# Patient Record
Sex: FEMALE | Race: WHITE | HISPANIC OR LATINO | Employment: FULL TIME | URBAN - METROPOLITAN AREA
[De-identification: names, ages, dates, MRNs, and addresses within clinical notes are randomized per-mention and may not be internally consistent; named-entity substitution may affect disease eponyms.]

---

## 2018-02-19 ENCOUNTER — HOSPITAL ENCOUNTER (EMERGENCY)
Facility: MEDICAL CENTER | Age: 48
End: 2018-02-19
Attending: EMERGENCY MEDICINE
Payer: COMMERCIAL

## 2018-02-19 VITALS
RESPIRATION RATE: 18 BRPM | BODY MASS INDEX: 34.48 KG/M2 | TEMPERATURE: 97.8 F | HEART RATE: 96 BPM | SYSTOLIC BLOOD PRESSURE: 130 MMHG | HEIGHT: 62 IN | OXYGEN SATURATION: 99 % | WEIGHT: 187.39 LBS | DIASTOLIC BLOOD PRESSURE: 90 MMHG

## 2018-02-19 DIAGNOSIS — B01.89 VARICELLA WITH COMPLICATION: ICD-10-CM

## 2018-02-19 PROCEDURE — 99283 EMERGENCY DEPT VISIT LOW MDM: CPT

## 2018-02-19 RX ORDER — VALACYCLOVIR HYDROCHLORIDE 1 G/1
1000 TABLET, FILM COATED ORAL 2 TIMES DAILY
Qty: 14 TAB | Refills: 0 | Status: SHIPPED | OUTPATIENT
Start: 2018-02-19 | End: 2018-02-26

## 2018-02-19 ASSESSMENT — PAIN SCALES - GENERAL: PAINLEVEL_OUTOF10: 3

## 2018-02-19 NOTE — ED NOTES
"Pt presents with complaints of intermittent fevers and diffuse body rash since this past Friday.  \"I believe I have chicken pocks.\"  "

## 2018-02-19 NOTE — ED NOTES
Mask applied and patient in isolation  Dr in to see patient and discharged to home with instructions and prescription.  Patient educated about infection being airborne and contact isolation with good understanding.  Home with familiy

## 2018-02-19 NOTE — ED PROVIDER NOTES
ED Provider Note  CHIEF COMPLAINT  Chief Complaint   Patient presents with   • Rash   • Fever       HPI  Mirta Chang is a 47 y.o. female who presents with rash. She was exposed to herpes zoster concerns she may have chickenpox. She has had fever some itching. She has no shortness of breath headache visual changes all other systems are negative    REVIEW OF SYSTEMS  See HPI for further details    PAST MEDICAL HISTORY  Past Medical History:   Diagnosis Date   • Arthritis     osteo back feet legs arms hands   • Asthma     daily inhaler   • Autism spectrum disorder 3/4/2015    Hochberger's disease  anxiety and depression   • Breath shortness    • Cold     may 1, 16   • Pain    • Snoring        FAMILY HISTORY  Family History   Problem Relation Age of Onset   • Diabetes Mother    • Diabetes Father    • Heart Disease Maternal Grandmother        SOCIAL HISTORY  Social History     Social History   • Marital status:      Spouse name: N/A   • Number of children: N/A   • Years of education: N/A     Social History Main Topics   • Smoking status: Never Smoker   • Smokeless tobacco: Never Used   • Alcohol use Yes      Comment: Occasionally   • Drug use: No   • Sexual activity: Not on file     Other Topics Concern   • Not on file     Social History Narrative   • No narrative on file       SURGICAL HISTORY  Past Surgical History:   Procedure Laterality Date   • CERVICAL DISK AND FUSION ANTERIOR Right 5/20/2016    Procedure: CERVICAL DISK AND FUSION ANTERIOR C5/6;  Surgeon: Jude Beauchamp III, M.D.;  Location: SURGERY [facility];  Service:    • GYN SURGERY      BTL reversal    • GYN SURGERY      c sec x 5   • TUBAL LIGATION         CURRENT MEDICATIONS  Home Medications    **Home medications have not yet been reviewed for this encounter**         ALLERGIES  Allergies   Allergen Reactions   • Influenza Virus Vaccine    • Lactose Nausea     No milk to drink.     • Eggs Itching     Also gets GI distress  "      PHYSICAL EXAM  VITAL SIGNS: /90   Pulse 96   Temp 36.6 °C (97.8 °F)   Resp 18   Ht 1.575 m (5' 2\") Comment: Statd  Wt 85 kg (187 lb 6.3 oz)   LMP 02/19/2018   SpO2 99%   BMI 34.27 kg/m²     Constitutional: Patient is alert and oriented x3 in no distress   HENT: Moist mucous membranes pharynx is clear  Eyes: No conjunctivitis  Neck: Trachea is midline no palpable thyroid  Lymphatic: Negative anterior cervical lymphadenopathy  Cardiovascular: Normal heart rate   Thorax & Lungs: Clear to auscultation  Abdomen: Obese nontender  Neurologic: Normal motor sensation station K cranial nerves  Extremities: Full range of motion  Skin: Patient has a sporadic rash of lesions about 2-3 mm on erythematous base anywhere from erythema or blistering to crusting consistent with varicella    Psychiatric: Affect normal, Judgment normal, Mood normal.           COURSE & MEDICAL DECISION MAKING  Pertinent Labs & Imaging studies reviewed. (See chart for details)  Patient does not have any other symptoms or rash and fever. I will place her on valacyclovir have her off work until rash clears given return precautions and follow-up    FINAL IMPRESSION  1.   1. Varicella with complication        2.   3.         Electronically signed by: Johnson Han, 2/19/2018 1:20 PM        "

## 2018-02-19 NOTE — DISCHARGE INSTRUCTIONS
Chickenpox, Adult  Chickenpox is an illness caused by a virus. Chickenpox can be very serious for adults. Adults have a higher risk for complications than children. Complications of chickenpox include:  · Pneumonia.  · Skin infection.  · Bone infection (osteomyelitis).  · Joint infection (septic arthritis).  · Brain infection (encephalitis).  · Toxic shock syndrome.  · Bleeding problems.  · Problems with balance and muscle control (cerebellar ataxia).  · Having a baby with a birth defect, if you are pregnant.  · Death.  If you have had chickenpox once, you probably will not get it again. If you are exposed to chickenpox and have never had the illness or the vaccine, you may develop the illness within 21 days.  CAUSES   Chickenpox is caused by a virus. This virus spreads easily from one person to another through the tiny droplets released into the air when an infected person coughs or sneezes. It also spreads through the fluid produced by the chickenpox rash.  RISK FACTORS  People who have never had chickenpox and have never been vaccinated are at risk. You may also be at greater risk for chickenpox if:  · You are a health care worker.  · You are a college student.  · You are in the .  · You live in an institution.  · You are a teacher.  · You have poor resistance to infection.  SIGNS AND SYMPTOMS   · Rash. The rash is made up of itchy blisters that heal over with a crusty scab. The rash may appear a day or two after other symptoms.  · Body aches and pain.  · Headache.  · Irritability.  · Tiredness.  · Fever.  · Sore throat.  DIAGNOSIS   Your health care provider will make a diagnosis based on your symptoms. You may have a blood test to confirm the diagnosis.  TREATMENT   Treatments may include:  · Taking medicine to shorten how long the illness lasts and to reduce its severity.  · Applying calamine lotion to relieve itchiness.  · Using baking soda or oatmeal baths to soothe itchy skin.  Ask your health care  provider if you may use a type of over-the-counter medicine called an antihistamine to reduce itching.  HOME CARE INSTRUCTIONS   · Take medicines only as directed by your health care provider.  · Try taking a bath in lukewarm water every few hours. Add several tablespoons of baking soda or oatmeal to the water to make the bath more soothing. Do not bathe in hot water.  · Apply an ice pack or a cold washcloth to the rash to relieve itching.  · Wash your hands often. This lowers your chance of getting a bacterial skin infection and passing the virus to others.  · Do not eat or drink spicy, salty, or acidic things if you have blisters in your mouth. Soft, bland, and cold foods and beverages will be easiest to swallow.  · Do not be around:  ¨ People who have not had chickenpox and have not been vaccinated against it.  ¨ People with a weakened immune system, including those with HIV, AIDS, or cancer, those who have had a transplant or are on chemotherapy, and those who take medicines that weaken the immune system.  ¨ Pregnant women.  · If a person is exposed to your chickenpox and has not had it before or been vaccinated against it, has a weakened immune system, or is pregnant, he or she should call a health care provider.  SEEK IMMEDIATE MEDICAL CARE IF:   · You have trouble breathing.  · You have a severe headache.  · You have a stiff neck.  · You have severe joint pain or stiffness.  · You feel disoriented or confused.  · You have trouble walking or keeping your balance.  · You have a fever and your symptoms suddenly get worse.  · The area around one of the blisters leaks pus or becomes very red, hot to the touch, or painful.  MAKE SURE YOU:  · Understand these instructions.  · Will watch your condition.  · Will get help right away if you are not doing well or get worse.     This information is not intended to replace advice given to you by your health care provider. Make sure you discuss any questions you have with  your health care provider.     Document Released: 09/26/2009 Document Revised: 01/08/2016 Document Reviewed: 11/11/2014  [employer] Interactive Patient Education ©2016 [employer]

## 2018-02-20 ENCOUNTER — PATIENT OUTREACH (OUTPATIENT)
Dept: HEALTH INFORMATION MANAGEMENT | Facility: OTHER | Age: 48
End: 2018-02-20

## 2018-05-21 ENCOUNTER — OFFICE VISIT (OUTPATIENT)
Dept: MEDICAL GROUP | Facility: MEDICAL CENTER | Age: 48
End: 2018-05-21
Payer: COMMERCIAL

## 2018-05-21 VITALS
OXYGEN SATURATION: 99 % | SYSTOLIC BLOOD PRESSURE: 112 MMHG | TEMPERATURE: 98.7 F | DIASTOLIC BLOOD PRESSURE: 66 MMHG | HEIGHT: 62 IN | BODY MASS INDEX: 33.49 KG/M2 | RESPIRATION RATE: 16 BRPM | WEIGHT: 182 LBS | HEART RATE: 94 BPM

## 2018-05-21 DIAGNOSIS — G43.109 MIGRAINE WITH AURA AND WITHOUT STATUS MIGRAINOSUS, NOT INTRACTABLE: ICD-10-CM

## 2018-05-21 DIAGNOSIS — Z13.220 SCREENING, LIPID: ICD-10-CM

## 2018-05-21 DIAGNOSIS — G89.29 CHRONIC NECK PAIN: ICD-10-CM

## 2018-05-21 DIAGNOSIS — M54.2 CHRONIC NECK PAIN: ICD-10-CM

## 2018-05-21 DIAGNOSIS — J45.20 MILD INTERMITTENT ASTHMA WITHOUT COMPLICATION: ICD-10-CM

## 2018-05-21 DIAGNOSIS — L65.9 HAIR LOSS: ICD-10-CM

## 2018-05-21 DIAGNOSIS — Z12.31 VISIT FOR SCREENING MAMMOGRAM: ICD-10-CM

## 2018-05-21 PROCEDURE — 99214 OFFICE O/P EST MOD 30 MIN: CPT | Performed by: INTERNAL MEDICINE

## 2018-05-21 RX ORDER — SUMATRIPTAN 100 MG/1
100 TABLET, FILM COATED ORAL
Qty: 10 TAB | Refills: 3 | Status: SHIPPED | OUTPATIENT
Start: 2018-05-21 | End: 2022-01-01

## 2018-05-21 RX ORDER — ALBUTEROL SULFATE 90 UG/1
1 AEROSOL, METERED RESPIRATORY (INHALATION) ONCE
OUTPATIENT
Start: 2018-05-21 | End: 2018-05-22

## 2018-05-21 ASSESSMENT — PATIENT HEALTH QUESTIONNAIRE - PHQ9: CLINICAL INTERPRETATION OF PHQ2 SCORE: 0

## 2018-05-21 NOTE — PROGRESS NOTES
"CC: Asthma, hair loss, neck pain.    HPI:   Mirta presents today with the following.    1. Mild intermittent asthma without complication  Presents with very mild asthma reporting using her inhaler only occasionally the last one was filled is over 2 years old needing new prescriptions.    2. Chronic neck pain  She does have chronic back pain with numbness in her fingertips but no weakness.  She has been followed by spine surgeon but has not seen him in some time.  States that she is concerned about going back his she feels he will likely recommend surgery.    3. Migraine with aura and without status migrainosus, not intractable  Reports headaches are still present about once per month she has taken Imitrex in the past which was helpful    4. Hair loss  Complaining of hair and skin changes weight is persistently elevated.  Having more difficult time with activity.        Patient Active Problem List    Diagnosis Date Noted   • Chronic neck pain 05/21/2018   • Annular tear of cervical disc 05/20/2016   • Autism spectrum disorder 03/04/2015   • Asthma, mild intermittent 12/22/2014       Current Outpatient Prescriptions   Medication Sig Dispense Refill   • sumatriptan (IMITREX) 100 MG tablet Take 1 Tab by mouth Once PRN for Migraine for up to 1 dose. 10 Tab 3     Current Facility-Administered Medications   Medication Dose Route Frequency Provider Last Rate Last Dose   • albuterol inhaler 1 Puff  1 Puff Inhalation Once Carlos Lao M.D.             Allergies as of 05/21/2018 - Reviewed 05/21/2018   Allergen Reaction Noted   • Influenza virus vaccine  07/19/2012   • Lactose Nausea 05/20/2016   • Eggs Itching 07/19/2012        ROS: Denies Chest pain, SOB, LE edema.    /66   Pulse 94   Temp 37.1 °C (98.7 °F)   Resp 16   Ht 1.575 m (5' 2\")   Wt 82.6 kg (182 lb)   SpO2 99%   BMI 33.29 kg/m²     Physical Exam:  Gen:         Alert and oriented, No apparent distress.  Neck:        No Lymphadenopathy or " Bruits.  Lungs:     Clear to auscultation bilaterally  CV:          Regular rate and rhythm. No murmurs, rubs or gallops.               Ext:          No clubbing, cyanosis, edema.      Assessment and Plan.   47 y.o. female with the following issues.    1. Mild intermittent asthma without complication  Stable refilled inhaler  - albuterol inhaler 1 Puff; Inhale 1 Puff by mouth Once.    2. Chronic neck pain  Follow along with surgeon    3. Migraine with aura and without status migrainosus, not intractable  Refilled immature  - sumatriptan (IMITREX) 100 MG tablet; Take 1 Tab by mouth Once PRN for Migraine for up to 1 dose.  Dispense: 10 Tab; Refill: 3    4. Hair loss  Sending for blood work  - TSH; Future    5. Screening, lipid    - COMP METABOLIC PANEL; Future  - LIPID PROFILE; Future    6. Visit for screening mammogram    - MA-SCREEN MAMMO W/CAD-BILAT; Future

## 2018-06-09 ENCOUNTER — HOSPITAL ENCOUNTER (OUTPATIENT)
Dept: LAB | Facility: MEDICAL CENTER | Age: 48
End: 2018-06-09
Attending: INTERNAL MEDICINE
Payer: COMMERCIAL

## 2018-06-09 ENCOUNTER — HOSPITAL ENCOUNTER (OUTPATIENT)
Dept: RADIOLOGY | Facility: MEDICAL CENTER | Age: 48
End: 2018-06-09
Attending: INTERNAL MEDICINE
Payer: COMMERCIAL

## 2018-06-09 DIAGNOSIS — L65.9 HAIR LOSS: ICD-10-CM

## 2018-06-09 DIAGNOSIS — Z13.220 SCREENING, LIPID: ICD-10-CM

## 2018-06-09 DIAGNOSIS — Z12.31 VISIT FOR SCREENING MAMMOGRAM: ICD-10-CM

## 2018-06-09 LAB
ALBUMIN SERPL BCP-MCNC: 4 G/DL (ref 3.2–4.9)
ALBUMIN/GLOB SERPL: 1.2 G/DL
ALP SERPL-CCNC: 64 U/L (ref 30–99)
ALT SERPL-CCNC: 15 U/L (ref 2–50)
ANION GAP SERPL CALC-SCNC: 8 MMOL/L (ref 0–11.9)
AST SERPL-CCNC: 15 U/L (ref 12–45)
BILIRUB SERPL-MCNC: 0.4 MG/DL (ref 0.1–1.5)
BUN SERPL-MCNC: 11 MG/DL (ref 8–22)
CALCIUM SERPL-MCNC: 8.9 MG/DL (ref 8.5–10.5)
CHLORIDE SERPL-SCNC: 107 MMOL/L (ref 96–112)
CHOLEST SERPL-MCNC: 159 MG/DL (ref 100–199)
CO2 SERPL-SCNC: 23 MMOL/L (ref 20–33)
CREAT SERPL-MCNC: 0.6 MG/DL (ref 0.5–1.4)
GLOBULIN SER CALC-MCNC: 3.4 G/DL (ref 1.9–3.5)
GLUCOSE SERPL-MCNC: 100 MG/DL (ref 65–99)
HDLC SERPL-MCNC: 44 MG/DL
LDLC SERPL CALC-MCNC: 98 MG/DL
POTASSIUM SERPL-SCNC: 3.8 MMOL/L (ref 3.6–5.5)
PROT SERPL-MCNC: 7.4 G/DL (ref 6–8.2)
SODIUM SERPL-SCNC: 138 MMOL/L (ref 135–145)
TRIGL SERPL-MCNC: 86 MG/DL (ref 0–149)
TSH SERPL DL<=0.005 MIU/L-ACNC: 1.96 UIU/ML (ref 0.38–5.33)

## 2018-06-09 PROCEDURE — 80061 LIPID PANEL: CPT

## 2018-06-09 PROCEDURE — 36415 COLL VENOUS BLD VENIPUNCTURE: CPT

## 2018-06-09 PROCEDURE — 80053 COMPREHEN METABOLIC PANEL: CPT

## 2018-06-09 PROCEDURE — 77067 SCR MAMMO BI INCL CAD: CPT

## 2018-06-09 PROCEDURE — 84443 ASSAY THYROID STIM HORMONE: CPT

## 2018-07-05 ENCOUNTER — OFFICE VISIT (OUTPATIENT)
Dept: MEDICAL GROUP | Facility: MEDICAL CENTER | Age: 48
End: 2018-07-05
Payer: COMMERCIAL

## 2018-07-05 ENCOUNTER — HOSPITAL ENCOUNTER (OUTPATIENT)
Facility: MEDICAL CENTER | Age: 48
End: 2018-07-05
Attending: INTERNAL MEDICINE
Payer: COMMERCIAL

## 2018-07-05 VITALS
WEIGHT: 181 LBS | HEART RATE: 96 BPM | SYSTOLIC BLOOD PRESSURE: 110 MMHG | RESPIRATION RATE: 16 BRPM | HEIGHT: 62 IN | BODY MASS INDEX: 33.31 KG/M2 | TEMPERATURE: 99.1 F | OXYGEN SATURATION: 99 % | DIASTOLIC BLOOD PRESSURE: 60 MMHG

## 2018-07-05 DIAGNOSIS — R73.02 IGT (IMPAIRED GLUCOSE TOLERANCE): ICD-10-CM

## 2018-07-05 DIAGNOSIS — Z12.4 CERVICAL CANCER SCREENING: ICD-10-CM

## 2018-07-05 PROCEDURE — 88175 CYTOPATH C/V AUTO FLUID REDO: CPT

## 2018-07-05 PROCEDURE — 99396 PREV VISIT EST AGE 40-64: CPT | Performed by: INTERNAL MEDICINE

## 2018-07-05 PROCEDURE — 87624 HPV HI-RISK TYP POOLED RSLT: CPT

## 2018-07-06 DIAGNOSIS — Z12.4 CERVICAL CANCER SCREENING: ICD-10-CM

## 2018-07-10 LAB
CYTOLOGY REG CYTOL: NORMAL
HPV HR 12 DNA CVX QL NAA+PROBE: NEGATIVE
HPV16 DNA SPEC QL NAA+PROBE: NEGATIVE
HPV18 DNA SPEC QL NAA+PROBE: NEGATIVE
SPECIMEN SOURCE: NORMAL

## 2019-04-06 ENCOUNTER — HOSPITAL ENCOUNTER (OUTPATIENT)
Dept: LAB | Facility: MEDICAL CENTER | Age: 49
End: 2019-04-06
Attending: NURSE PRACTITIONER
Payer: COMMERCIAL

## 2019-04-06 LAB
BASOPHILS # BLD AUTO: 0.9 % (ref 0–1.8)
BASOPHILS # BLD: 0.05 K/UL (ref 0–0.12)
EOSINOPHIL # BLD AUTO: 0.08 K/UL (ref 0–0.51)
EOSINOPHIL NFR BLD: 1.5 % (ref 0–6.9)
ERYTHROCYTE [DISTWIDTH] IN BLOOD BY AUTOMATED COUNT: 41.9 FL (ref 35.9–50)
HCT VFR BLD AUTO: 40.7 % (ref 37–47)
HGB BLD-MCNC: 13.3 G/DL (ref 12–16)
IMM GRANULOCYTES # BLD AUTO: 0.02 K/UL (ref 0–0.11)
IMM GRANULOCYTES NFR BLD AUTO: 0.4 % (ref 0–0.9)
LYMPHOCYTES # BLD AUTO: 1.54 K/UL (ref 1–4.8)
LYMPHOCYTES NFR BLD: 28.2 % (ref 22–41)
MCH RBC QN AUTO: 28.2 PG (ref 27–33)
MCHC RBC AUTO-ENTMCNC: 32.7 G/DL (ref 33.6–35)
MCV RBC AUTO: 86.2 FL (ref 81.4–97.8)
MONOCYTES # BLD AUTO: 0.42 K/UL (ref 0–0.85)
MONOCYTES NFR BLD AUTO: 7.7 % (ref 0–13.4)
NEUTROPHILS # BLD AUTO: 3.35 K/UL (ref 2–7.15)
NEUTROPHILS NFR BLD: 61.3 % (ref 44–72)
NRBC # BLD AUTO: 0 K/UL
NRBC BLD-RTO: 0 /100 WBC
PLATELET # BLD AUTO: 212 K/UL (ref 164–446)
PMV BLD AUTO: 11.2 FL (ref 9–12.9)
RBC # BLD AUTO: 4.72 M/UL (ref 4.2–5.4)
WBC # BLD AUTO: 5.5 K/UL (ref 4.8–10.8)

## 2019-04-06 PROCEDURE — 85025 COMPLETE CBC W/AUTO DIFF WBC: CPT

## 2019-04-06 PROCEDURE — 85610 PROTHROMBIN TIME: CPT

## 2019-04-06 PROCEDURE — 36415 COLL VENOUS BLD VENIPUNCTURE: CPT

## 2019-04-06 PROCEDURE — 85730 THROMBOPLASTIN TIME PARTIAL: CPT

## 2019-04-08 LAB
APTT PPP: 28.1 SEC (ref 24.7–36)
INR PPP: 0.96 (ref 0.87–1.13)
PROTHROMBIN TIME: 12.9 SEC (ref 12–14.6)

## 2019-04-23 ENCOUNTER — HOSPITAL ENCOUNTER (OUTPATIENT)
Dept: RADIOLOGY | Facility: MEDICAL CENTER | Age: 49
End: 2019-04-23
Attending: PSYCHIATRY & NEUROLOGY
Payer: COMMERCIAL

## 2019-04-23 ENCOUNTER — HOSPITAL ENCOUNTER (OUTPATIENT)
Facility: MEDICAL CENTER | Age: 49
End: 2019-04-23
Attending: PSYCHIATRY & NEUROLOGY
Payer: COMMERCIAL

## 2019-04-23 DIAGNOSIS — R20.0 TACTILE ANESTHESIA: ICD-10-CM

## 2019-04-23 DIAGNOSIS — G24.8 DYSTONIA LENTICULARIS: ICD-10-CM

## 2019-04-23 LAB
BURR CELLS/RBC NFR CSF MANUAL: 0 %
CLARITY CSF: CLEAR
COLOR CSF: COLORLESS
COLOR SPUN CSF: COLORLESS
CYTOLOGY REG CYTOL: NORMAL
GLUCOSE CSF-MCNC: 68 MG/DL (ref 40–80)
GRAM STN SPEC: NORMAL
LYMPHOCYTES NFR CSF: 83 %
MONONUC CELLS NFR CSF: 17 %
PROT CSF-MCNC: 17 MG/DL (ref 15–45)
RBC # CSF: 0 CELLS/UL
SIGNIFICANT IND 70042: NORMAL
SITE SITE: NORMAL
SOURCE SOURCE: NORMAL
SPECIMEN VOL CSF: 16 ML
TUBE # CSF: 3
TUBE # CSF: 3
WBC # CSF: 1 CELLS/UL (ref 0–10)

## 2019-04-23 PROCEDURE — 82945 GLUCOSE OTHER FLUID: CPT

## 2019-04-23 PROCEDURE — 82784 ASSAY IGA/IGD/IGG/IGM EACH: CPT | Mod: 91

## 2019-04-23 PROCEDURE — 82040 ASSAY OF SERUM ALBUMIN: CPT

## 2019-04-23 PROCEDURE — 86255 FLUORESCENT ANTIBODY SCREEN: CPT

## 2019-04-23 PROCEDURE — 83916 OLIGOCLONAL BANDS: CPT

## 2019-04-23 PROCEDURE — 87070 CULTURE OTHR SPECIMN AEROBIC: CPT

## 2019-04-23 PROCEDURE — 84157 ASSAY OF PROTEIN OTHER: CPT

## 2019-04-23 PROCEDURE — 87798 DETECT AGENT NOS DNA AMP: CPT | Mod: 91

## 2019-04-23 PROCEDURE — 86316 IMMUNOASSAY TUMOR OTHER: CPT

## 2019-04-23 PROCEDURE — 86789 WEST NILE VIRUS ANTIBODY: CPT

## 2019-04-23 PROCEDURE — 86788 WEST NILE VIRUS AB IGM: CPT

## 2019-04-23 PROCEDURE — 87476 LYME DIS DNA AMP PROBE: CPT

## 2019-04-23 PROCEDURE — 62270 DX LMBR SPI PNXR: CPT

## 2019-04-23 PROCEDURE — 89051 BODY FLUID CELL COUNT: CPT

## 2019-04-23 PROCEDURE — 82042 OTHER SOURCE ALBUMIN QUAN EA: CPT

## 2019-04-23 PROCEDURE — 82164 ANGIOTENSIN I ENZYME TEST: CPT

## 2019-04-23 PROCEDURE — 86694 HERPES SIMPLEX NES ANTBDY: CPT

## 2019-04-23 PROCEDURE — 87205 SMEAR GRAM STAIN: CPT

## 2019-04-23 PROCEDURE — 87181 SC STD AGAR DILUTION PER AGT: CPT | Mod: 91

## 2019-04-23 PROCEDURE — 86592 SYPHILIS TEST NON-TREP QUAL: CPT

## 2019-04-23 RX ORDER — LIDOCAINE HYDROCHLORIDE 10 MG/ML
INJECTION, SOLUTION INFILTRATION; PERINEURAL
Status: DISPENSED
Start: 2019-04-23 | End: 2019-04-24

## 2019-04-24 NOTE — OR SURGEON
Immediate Post- Operative Note    PostOp Diagnosis: No DDD    Procedure(s): LP w/ fluoro    Estimated Blood Loss: Less than 1 ml    Complications: None    4/23/2019     5:22 PM     Dante Mckenna

## 2019-04-26 ENCOUNTER — ANESTHESIA EVENT (OUTPATIENT)
Dept: EMERGENCY MEDICINE | Facility: MEDICAL CENTER | Age: 49
End: 2019-04-26
Payer: COMMERCIAL

## 2019-04-26 ENCOUNTER — HOSPITAL ENCOUNTER (EMERGENCY)
Facility: MEDICAL CENTER | Age: 49
End: 2019-04-26
Attending: EMERGENCY MEDICINE
Payer: COMMERCIAL

## 2019-04-26 ENCOUNTER — ANESTHESIA (OUTPATIENT)
Dept: EMERGENCY MEDICINE | Facility: MEDICAL CENTER | Age: 49
End: 2019-04-26
Payer: COMMERCIAL

## 2019-04-26 VITALS
WEIGHT: 181.44 LBS | TEMPERATURE: 98.9 F | HEIGHT: 62 IN | HEART RATE: 74 BPM | SYSTOLIC BLOOD PRESSURE: 153 MMHG | BODY MASS INDEX: 33.39 KG/M2 | OXYGEN SATURATION: 98 % | RESPIRATION RATE: 16 BRPM | DIASTOLIC BLOOD PRESSURE: 94 MMHG

## 2019-04-26 DIAGNOSIS — G97.1 SPINAL HEADACHE: ICD-10-CM

## 2019-04-26 LAB
ALB CSF/SERPL: 2.3 RATIO (ref 0–9)
ALBUMIN CSF-MCNC: 9 MG/DL (ref 0–35)
ALBUMIN SERPL BCP-MCNC: 3.7 G/DL (ref 3.2–4.9)
ALBUMIN SERPL-MCNC: 3990 MG/DL (ref 3500–5200)
ALBUMIN/GLOB SERPL: 1.2 G/DL
ALP SERPL-CCNC: 51 U/L (ref 30–99)
ALT SERPL-CCNC: 20 U/L (ref 2–50)
ANION GAP SERPL CALC-SCNC: 6 MMOL/L (ref 0–11.9)
AST SERPL-CCNC: 19 U/L (ref 12–45)
BASOPHILS # BLD AUTO: 0.6 % (ref 0–1.8)
BASOPHILS # BLD: 0.03 K/UL (ref 0–0.12)
BILIRUB SERPL-MCNC: 0.4 MG/DL (ref 0.1–1.5)
BUN SERPL-MCNC: 7 MG/DL (ref 8–22)
CALCIUM SERPL-MCNC: 8.5 MG/DL (ref 8.4–10.2)
CHLORIDE SERPL-SCNC: 107 MMOL/L (ref 96–112)
CO2 SERPL-SCNC: 24 MMOL/L (ref 20–33)
CREAT SERPL-MCNC: 0.58 MG/DL (ref 0.5–1.4)
EOSINOPHIL # BLD AUTO: 0.08 K/UL (ref 0–0.51)
EOSINOPHIL NFR BLD: 1.5 % (ref 0–6.9)
ERYTHROCYTE [DISTWIDTH] IN BLOOD BY AUTOMATED COUNT: 45.1 FL (ref 35.9–50)
GLOBULIN SER CALC-MCNC: 3 G/DL (ref 1.9–3.5)
GLUCOSE SERPL-MCNC: 100 MG/DL (ref 65–99)
HCT VFR BLD AUTO: 40.3 % (ref 37–47)
HGB BLD-MCNC: 13.3 G/DL (ref 12–16)
HSV1+2 IGM CSF-ACNC: 0.11 IV
IGG CSF-MCNC: 1.2 MG/DL (ref 0–6)
IGG SERPL-MCNC: 1210 MG/DL (ref 768–1632)
IGG SYNTH RATE SER+CSF CALC-MRATE: <0 MG/D
IGG/ALB CLEAR SER+CSF-RTO: 0.44 RATIO (ref 0.28–0.66)
IGG/ALB CSF: 0.13 RATIO (ref 0.09–0.25)
IMM GRANULOCYTES # BLD AUTO: 0.02 K/UL (ref 0–0.11)
IMM GRANULOCYTES NFR BLD AUTO: 0.4 % (ref 0–0.9)
JC VIRUS SOURCE  Q4279: NORMAL
JCPYV DNA SERPL QL NAA+PROBE: NOT DETECTED
LYMPHOCYTES # BLD AUTO: 1.25 K/UL (ref 1–4.8)
LYMPHOCYTES NFR BLD: 23.3 % (ref 22–41)
MCH RBC QN AUTO: 28.1 PG (ref 27–33)
MCHC RBC AUTO-ENTMCNC: 33 G/DL (ref 33.6–35)
MCV RBC AUTO: 85.2 FL (ref 81.4–97.8)
MONOCYTES # BLD AUTO: 0.46 K/UL (ref 0–0.85)
MONOCYTES NFR BLD AUTO: 8.6 % (ref 0–13.4)
NEUTROPHILS # BLD AUTO: 3.52 K/UL (ref 2–7.15)
NEUTROPHILS NFR BLD: 65.6 % (ref 44–72)
NRBC # BLD AUTO: 0 K/UL
NRBC BLD-RTO: 0 /100 WBC
NSE SERPL-MCNC: 11.4 UG/L (ref 3.7–8.9)
OLIGOCLONAL BANDS CSF ELPH-IMP: NEGATIVE
OLIGOCLONAL BANDS CSF ELPH-IMP: NORMAL
OLIGOCLONAL BANDS CSF IEF: 0 BANDS (ref 0–1)
PLATELET # BLD AUTO: 194 K/UL (ref 164–446)
PMV BLD AUTO: 10.8 FL (ref 9–12.9)
POTASSIUM SERPL-SCNC: 3.5 MMOL/L (ref 3.6–5.5)
PROCALCITONIN SERPL-MCNC: <0.05 NG/ML
PROT SERPL-MCNC: 6.7 G/DL (ref 6–8.2)
RBC # BLD AUTO: 4.73 M/UL (ref 4.2–5.4)
SODIUM SERPL-SCNC: 137 MMOL/L (ref 135–145)
SPECIMEN SOURCE: NORMAL
VZV DNA SPEC QL NAA+PROBE: NOT DETECTED
WBC # BLD AUTO: 5.4 K/UL (ref 4.8–10.8)
WNV IGG CSF IA-ACNC: 0.04 IV
WNV IGM CSF IA-ACNC: 0.01 IV

## 2019-04-26 PROCEDURE — 36415 COLL VENOUS BLD VENIPUNCTURE: CPT

## 2019-04-26 PROCEDURE — 80053 COMPREHEN METABOLIC PANEL: CPT

## 2019-04-26 PROCEDURE — 700111 HCHG RX REV CODE 636 W/ 250 OVERRIDE (IP): Performed by: EMERGENCY MEDICINE

## 2019-04-26 PROCEDURE — 96374 THER/PROPH/DIAG INJ IV PUSH: CPT

## 2019-04-26 PROCEDURE — 84145 PROCALCITONIN (PCT): CPT

## 2019-04-26 PROCEDURE — A9270 NON-COVERED ITEM OR SERVICE: HCPCS | Performed by: EMERGENCY MEDICINE

## 2019-04-26 PROCEDURE — 99285 EMERGENCY DEPT VISIT HI MDM: CPT

## 2019-04-26 PROCEDURE — 700102 HCHG RX REV CODE 250 W/ 637 OVERRIDE(OP): Performed by: EMERGENCY MEDICINE

## 2019-04-26 PROCEDURE — 85025 COMPLETE CBC W/AUTO DIFF WBC: CPT

## 2019-04-26 PROCEDURE — 700105 HCHG RX REV CODE 258: Performed by: EMERGENCY MEDICINE

## 2019-04-26 RX ORDER — ACETAMINOPHEN 325 MG/1
975 TABLET ORAL ONCE
Status: COMPLETED | OUTPATIENT
Start: 2019-04-26 | End: 2019-04-26

## 2019-04-26 RX ORDER — KETOROLAC TROMETHAMINE 30 MG/ML
30 INJECTION, SOLUTION INTRAMUSCULAR; INTRAVENOUS ONCE
Status: COMPLETED | OUTPATIENT
Start: 2019-04-26 | End: 2019-04-26

## 2019-04-26 RX ORDER — SODIUM CHLORIDE 9 MG/ML
1000 INJECTION, SOLUTION INTRAVENOUS ONCE
Status: COMPLETED | OUTPATIENT
Start: 2019-04-26 | End: 2019-04-26

## 2019-04-26 RX ORDER — FERROUS SULFATE 325(65) MG
325 TABLET ORAL DAILY
COMMUNITY

## 2019-04-26 RX ADMIN — SODIUM CHLORIDE 1000 ML: 9 INJECTION, SOLUTION INTRAVENOUS at 08:48

## 2019-04-26 RX ADMIN — ACETAMINOPHEN 975 MG: 325 TABLET, FILM COATED ORAL at 08:49

## 2019-04-26 RX ADMIN — KETOROLAC TROMETHAMINE 30 MG: 30 INJECTION, SOLUTION INTRAMUSCULAR at 08:52

## 2019-04-26 NOTE — ANESTHESIA TIME REPORT
Anesthesia Start and Stop Event Times    No anesthesia events filed.       Responsible Staff    No responsible staff documented.       Preop Diagnosis (Free Text):  Pre-op Diagnosis             Preop Diagnosis (Codes):    Post op Diagnosis  Spinal headache      Premium Reason  Non-Premium    Comments:

## 2019-04-26 NOTE — ED NOTES
"Pt is accompanied by family.  She presents with a medical history significant for the following:   Date   • Arthritis       osteo back feet legs arms hands   • Asthma       daily inhaler   • Autism spectrum disorder 3/4/2015     Hochberger's disease  anxiety and depression   • Breath shortness     • Cold       may 1, 16   • Pain     • Snoring      She C/O worsening headache, and photo sensitivity recurring since this past Tuesday after a lumbar puncture.  She also reports shooting pain affecting her left leg.   Chief Complaint   Patient presents with   • Headache     /94 Comment: No Hx of  Pulse 75   Temp 37.2 °C (99 °F) (Temporal)   Resp 16   Ht 1.575 m (5' 2\")   Wt 82.3 kg (181 lb 7 oz)   LMP 04/26/2019   SpO2 95%   BMI 33.19 kg/m²      "

## 2019-04-26 NOTE — ED NOTES
ERP at bedside. Pt agrees with plan of care discussed by ERP. AIDET acknowledged with patient. Nati in low position, side rail up for pt safety. Call light within reach. IV established. Blood sent to lab. Medicinal interventions carried out per ERP orders. Will continue to monitor.

## 2019-04-26 NOTE — ED PROVIDER NOTES
ED Provider Note    CHIEF COMPLAINT  Chief Complaint   Patient presents with   • Headache       HPI  Mirta Hawk is a 48 y.o. female who presents for evaluation of numerous complaints including headache, myalgias, left leg tingling.  Patient was sent over by her neurologist Dr. Read for diagnostic lumbar puncture 3 days ago at interventional radiology at this facility.  Apparently she been having some transient odd symptoms and they were concerned about several things including multiple sclerosis infection.  The patient reports that she felt some pain and tingling during the lumbar puncture and reports tingling down her left leg.  She also reports headache generally described as worse in the upright position somewhat relieved in the supine position.  No associated high fevers neck stiffness or rash.  The patient does not report any incontinence bowel or bladder dysfunction    REVIEW OF SYSTEMS  See HPI for further details.  No high fevers incontinence nausea vomiting or diarrhea all other systems are negative.     PAST MEDICAL HISTORY  Past Medical History:   Diagnosis Date   • Autism spectrum disorder 3/4/2015    Hochberger's disease  anxiety and depression   • Arthritis     osteo back feet legs arms hands   • Asthma     daily inhaler   • Breath shortness    • Cold     may 1, 16   • Pain    • Snoring        FAMILY HISTORY  Noncontributory    SOCIAL HISTORY  Social History     Social History   • Marital status:      Spouse name: N/A   • Number of children: N/A   • Years of education: N/A     Social History Main Topics   • Smoking status: Never Smoker   • Smokeless tobacco: Never Used   • Alcohol use Yes      Comment: Occasionally   • Drug use: No   • Sexual activity: Not on file     Other Topics Concern   • Not on file     Social History Narrative   • No narrative on file       SURGICAL HISTORY  Past Surgical History:   Procedure Laterality Date   • CERVICAL DISK AND FUSION ANTERIOR  "Right 5/20/2016    Procedure: CERVICAL DISK AND FUSION ANTERIOR C5/6;  Surgeon: Jude Beauchamp III, M.D.;  Location: SURGERY [facility];  Service:    • GYN SURGERY      BTL reversal    • GYN SURGERY      c sec x 5   • TUBAL LIGATION         CURRENT MEDICATIONS  Home Medications     Reviewed by Charly Jang (Pharmacy Tech) on 04/26/19 at 0833  Med List Status: Complete   Medication Last Dose Status   Cyanocobalamin (VITAMIN B-12 PO) FEW DAYS AGO Active   cyclobenzaprine (FLEXERIL) 10 MG Tab PRN Active   ferrous sulfate 325 (65 Fe) MG tablet FEW DAYS AGO Active   Multiple Vitamin (MULTI VITAMIN PO) 4/25/2019 Active   Potassium (POTASSIMIN PO) FEW DAYS AGO Active   PROAIR  (90 Base) MCG/ACT Aero Soln inhalation aerosol PRN Active   sumatriptan (IMITREX) 100 MG tablet PRN Active                ALLERGIES  Allergies   Allergen Reactions   • Influenza Virus Vaccine    • Lactose Nausea     No milk to drink.     • Eggs Itching     Also gets GI distress       PHYSICAL EXAM  VITAL SIGNS: /94 Comment: No Hx of  Pulse 69   Temp 37.2 °C (99 °F) (Temporal)   Resp 16   Ht 1.575 m (5' 2\")   Wt 82.3 kg (181 lb 7 oz)   LMP 04/26/2019   SpO2 98%   BMI 33.19 kg/m²  Room air O2: 95    Constitutional: Patient appears uncomfortable.   HENT: Normocephalic, Atraumatic, Bilateral external ears normal, Oropharynx moist, No oral exudates, Nose normal.   Eyes: PERRLA, EOMI, Conjunctiva normal, No discharge.   Neck: Normal range of motion, No tenderness, Supple, No stridor.   Lymphatic: No lymphadenopathy noted.   Cardiovascular: Normal heart rate, Normal rhythm, No murmurs, No rubs, No gallops.   Thorax & Lungs: Normal breath sounds, No respiratory distress, No wheezing, No chest tenderness.   Abdomen: Bowel sounds normal, Soft, No tenderness, No masses, No pulsatile masses.   Skin: Warm, Dry, No erythema, No rash.   Back: No tenderness, No CVA tenderness.   Extremities: Intact distal pulses, No edema, No " tenderness, No cyanosis, No clubbing.   Neurologic: Alert & oriented x 3, Normal motor function, Normal sensory function, No focal deficits noted.   Psychiatric: Anxious    Results for orders placed or performed during the hospital encounter of 04/26/19   PROCALCITONIN   Result Value Ref Range    Procalcitonin <0.05 <0.25 ng/mL   CBC WITH DIFFERENTIAL   Result Value Ref Range    WBC 5.4 4.8 - 10.8 K/uL    RBC 4.73 4.20 - 5.40 M/uL    Hemoglobin 13.3 12.0 - 16.0 g/dL    Hematocrit 40.3 37.0 - 47.0 %    MCV 85.2 81.4 - 97.8 fL    MCH 28.1 27.0 - 33.0 pg    MCHC 33.0 (L) 33.6 - 35.0 g/dL    RDW 45.1 35.9 - 50.0 fL    Platelet Count 194 164 - 446 K/uL    MPV 10.8 9.0 - 12.9 fL    Neutrophils-Polys 65.60 44.00 - 72.00 %    Lymphocytes 23.30 22.00 - 41.00 %    Monocytes 8.60 0.00 - 13.40 %    Eosinophils 1.50 0.00 - 6.90 %    Basophils 0.60 0.00 - 1.80 %    Immature Granulocytes 0.40 0.00 - 0.90 %    Nucleated RBC 0.00 /100 WBC    Neutrophils (Absolute) 3.52 2.00 - 7.15 K/uL    Lymphs (Absolute) 1.25 1.00 - 4.80 K/uL    Monos (Absolute) 0.46 0.00 - 0.85 K/uL    Eos (Absolute) 0.08 0.00 - 0.51 K/uL    Baso (Absolute) 0.03 0.00 - 0.12 K/uL    Immature Granulocytes (abs) 0.02 0.00 - 0.11 K/uL    NRBC (Absolute) 0.00 K/uL   Comp Metabolic Panel   Result Value Ref Range    Sodium 137 135 - 145 mmol/L    Potassium 3.5 (L) 3.6 - 5.5 mmol/L    Chloride 107 96 - 112 mmol/L    Co2 24 20 - 33 mmol/L    Anion Gap 6.0 0.0 - 11.9    Glucose 100 (H) 65 - 99 mg/dL    Bun 7 (L) 8 - 22 mg/dL    Creatinine 0.58 0.50 - 1.40 mg/dL    Calcium 8.5 8.4 - 10.2 mg/dL    AST(SGOT) 19 12 - 45 U/L    ALT(SGPT) 20 2 - 50 U/L    Alkaline Phosphatase 51 30 - 99 U/L    Total Bilirubin 0.4 0.1 - 1.5 mg/dL    Albumin 3.7 3.2 - 4.9 g/dL    Total Protein 6.7 6.0 - 8.2 g/dL    Globulin 3.0 1.9 - 3.5 g/dL    A-G Ratio 1.2 g/dL   ESTIMATED GFR   Result Value Ref Range    GFR If African American >60 >60 mL/min/1.73 m 2    GFR If Non  >60 >60  mL/min/1.73 m 2        COURSE & MEDICAL DECISION MAKING  Pertinent Labs & Imaging studies reviewed. (See chart for details)  An IV was established.  The patient was given IV fluids due to high concern for spinal headache and she could not take clear liquids.  After IV fluid administration as well as Toradol she still had a severe headache worsened in the upright position.  Extensive work was performed trying to coordinate a blood patch.  Interventional radiology is not available here we are can coordinate it up at [facility] but anesthesia was very gracious to come down and provide the service.  The patient had a blood patch and immediately felt improved.  I did review her records and the CSF culture from 3 days ago did have positive staph on 1 of the tubes.  I reviewed the cell counts and the Gram stain both of which were normal.  The speciation is still pending this is almost certainly a contaminant and she has no fever leukocytosis neck stiffness and her procalcitonin is normal therefore I feel that bacterial meningitis is exceedingly unlikely.  She will be discharged home with follow-up with her neurologist    FINAL IMPRESSION  1.  Spinal headache         Electronically signed by: Jake Frazier, 4/26/2019 8:44 AM

## 2019-04-27 LAB
ACE CSF-CCNC: 1 U/L (ref 0–2.5)
VDRL CSF QL: NON REACTIVE

## 2019-04-29 LAB
B BURGDOR DNA SPEC QL NAA+PROBE: NOT DETECTED
LYME SOURCE Q4169: NORMAL

## 2019-04-29 NOTE — ANESTHESIA PREPROCEDURE EVALUATION
Relevant Problems   (+) Asthma, mild intermittent       Physical Exam    Airway   Mallampati: II  TM distance: >3 FB  Neck ROM: full       Cardiovascular - normal exam  Rhythm: regular  Rate: normal  (-) murmur     Dental - normal exam         Pulmonary - normal exam  Breath sounds clear to auscultation     Abdominal    Neurological - normal exam                 Anesthesia Plan    ASA 2       Plan - other                   Pertinent diagnostic labs and testing reviewed    Informed Consent:    Anesthetic plan and risks discussed with patient.    Use of blood products discussed with: patient whom consented to blood products.

## 2019-04-29 NOTE — ANESTHESIA QCDR
2019 Qualified Clinical Data Registry (for Quality Improvement)     Postoperative nausea/vomiting risk protocol (Adult = 18 yrs and Pediatric 3-17 yrs)- (430 and 463)  General inhalation anesthetic (NOT TIVA) with PONV risk factors: No  Provision of anti-emetic therapy with at least 2 different classes of agents: N/A  Patient DID NOT receive anti-emetic therapy and reason is documented in Medical Record: N/A    Multimodal Pain Management- (AQI59)  Patient undergoing Elective Surgery (i.e. Outpatient, or ASC, or Prescheduled Surgery prior to Hospital Admission): No  Use of Multimodal Pain Management, two or more drugs and/or interventions, NOT including systemic opioids: N/A  Exception: Documented allergy to multiple classes of analgesics: N/A    PACU assessment of acute postoperative pain prior to Anesthesia Care End- Applies to Patients Age = 18- (ABG7)  Initial PACU pain score is which of the following: < 7/10  Patient unable to report pain score: N/A    Post-anesthetic transfer of care checklist/protocol to PACU/ICU- (426 and 427)  Upon conclusion of case, patient transferred to which of the following locations:   Use of transfer checklist/protocol: No  Exclusion: Service Performed in Patient Hospital Room (and thus did not require transfer): No     PACU Reintubation- (AQI31)  General anesthesia requiring endotracheal intubation (ETT) along with subsequent extubation in OR or PACU: No  Required reintubation in the PACU: N/A  Extubation was a planned trial documented in the medical record prior to removal of the original airway device: N/A    Unplanned admission to ICU related to anesthesia service up through end of PACU care- (MD51)  Unplanned admission to ICU (not initially anticipated at anesthesia start time): No

## 2019-04-29 NOTE — ADDENDUM NOTE
Addendum  created 04/29/19 1155 by Jus Ryan M.D.    Anesthesia Event edited, Sign clinical note

## 2019-04-30 LAB
BACTERIA CSF CULT: ABNORMAL
GRAM STN SPEC: ABNORMAL
SIGNIFICANT IND 70042: ABNORMAL
SITE SITE: ABNORMAL
SOURCE SOURCE: ABNORMAL

## 2019-05-01 LAB — TEST NAME 95000: NORMAL

## 2019-05-21 ENCOUNTER — NON-PROVIDER VISIT (OUTPATIENT)
Dept: NEUROLOGY | Facility: MEDICAL CENTER | Age: 49
End: 2019-05-21
Payer: COMMERCIAL

## 2019-05-21 DIAGNOSIS — G24.8 DYSTONIA LENTICULARIS: ICD-10-CM

## 2019-05-21 PROCEDURE — 95819 EEG AWAKE AND ASLEEP: CPT | Performed by: PSYCHIATRY & NEUROLOGY

## 2019-05-21 NOTE — PROCEDURES
Called patient to discuss pathology results. Recommend patient follow up with Infectious Disease and Pulmonology for further work up and/or treatment. We will keep previously scheduled appointment with Dr. Rodriguez for incision check and CXR.    ROUTINE ELECTROENCEPHALOGRAM REPORT      Referring provider: Dr. Hung.     DOS: 5/21/2019 (total recording of 24 minutes)    INDICATION:  Mirta Seay Hawk 48 y.o. female presenting with ataxia, numbness, dizzy spells.    CURRENT ANTIEPILEPTIC REGIMEN: None.    TECHNIQUE: 30 channel routine electroencephalogram (EEG) was performed in accordance with the international 10-20 system. The study was reviewed in bipolar and referential montages. The recording examined the patient during wakeful and drowsy/sleep state(s).     DESCRIPTION OF THE RECORD:  During the wakefulness, the background showed a symmetrical 10 hz alpha activity posteriorly with amplitude of 70 mV.  There was reactivity to eye closure/opening.  A normal anterior-posterior gradient was noted with faster beta frequencies seen anteriorly.  During drowsiness, theta/delta frequencies were seen.    During the sleep state, background shows diffuse high-amplitude 4-5 Hz delta activity.  Symmetrical high-amplitude sleep spindles and vertex sharps were seen in the leads over the central regions.     ACTIVATION PROCEDURES:   Hyperventilation was performed by the patient for a total of 3 minutes. The technician performing the test noted good effort. This technique failed to produced electroencephalographic changes.     Intermittent Photic stimulation was performed in a stepwise fashion from 1 to 30 Hz and elicited a normal response (photic driving), most noticeable in the posterior leads.      ICTAL AND/OR INTERICTAL FINDINGS:   No focal or generalized epileptiform activity noted. No regional slowing was seen during this routine study.  No clinical events or seizures were reported or recorded during the study.     EKG: sampling of the EKG recording demonstrated sinus rhythm.       INTERPRETATION:  This is a normal routine EEG recording in the awake, drowsy, and sleep state(s).  Clinical correlation is recommended.    Note: A normal EEG does not  rule out epilepsy.  If the clinical suspicion remains high for seizures, a prolonged recording to capture clinical or subclinical events may be helpful.        Will Ro MD   Epilepsy and Neurodiagnostics.   Clinical  of Neurology [facility].   Diplomate in Neurology, Epilepsy, and Electrodiagnostic Medicine.   Office: [phone]  Fax: [phone]

## 2019-07-23 NOTE — PROGRESS NOTES
"CC: Pap smear follow-up blood work    HPI:   Mirta presents today with the following.    1. Cervical cancer screening  G 5 P5 with a history of irregular Pap smears never having cancer been several years since her last exam.  Denying any current complaints abdominal pain or spotting no discharge.    2. IGT (impaired glucose tolerance)  Does have recent blood work showing a blood sugar of 100.  She does have a family history of diabetes.      Patient Active Problem List    Diagnosis Date Noted   • Chronic neck pain 05/21/2018   • Annular tear of cervical disc 05/20/2016   • Autism spectrum disorder 03/04/2015   • Asthma, mild intermittent 12/22/2014       Current Outpatient Prescriptions   Medication Sig Dispense Refill   • cyclobenzaprine (FLEXERIL) 10 MG Tab Take 1 Tab by mouth 3 times a day as needed. 60 Tab 0   • PROAIR  (90 Base) MCG/ACT Aero Soln inhalation aerosol Inhale 2 Puffs by mouth every 6 hours as needed for Shortness of Breath. 3 Inhaler 2   • sumatriptan (IMITREX) 100 MG tablet Take 1 Tab by mouth Once PRN for Migraine for up to 1 dose. 10 Tab 3     No current facility-administered medications for this visit.          Allergies as of 07/05/2018 - Reviewed 07/05/2018   Allergen Reaction Noted   • Influenza virus vaccine  07/19/2012   • Lactose Nausea 05/20/2016   • Eggs Itching 07/19/2012        ROS: Denies Chest pain, SOB, LE edema.    /60   Pulse 96   Temp 37.3 °C (99.1 °F)   Resp 16   Ht 1.575 m (5' 2\")   Wt 82.1 kg (181 lb)   LMP 06/12/2018   SpO2 99%   Breastfeeding? No   BMI 33.11 kg/m²     Physical Exam:  Gen:         Alert and oriented, No apparent distress.  Neck:        No Lymphadenopathy or Bruits.  Lungs:     Clear to auscultation bilaterally  CV:          Regular rate and rhythm. No murmurs, rubs or gallops.               Ext:          No clubbing, cyanosis, edema.             normal-appearing vaginal wall and cervix no adnexal fullness    Assessment and Plan. " Occupational Therapy Progress Note    Referred by: RENETTA Barreto  Medical Diagnosis (from order):   Diagnosis Information      Diagnosis    V54.12 (ICD-9-CM) - S52.124D (ICD-10-CM) - Closed nondisplaced fracture of head of right radius with routine healing, subsequent encounter            Current Functional Limitations: Patient unable to lift, push, or pull per MD recommendations. Limited with reaching motions, specifically with elbow extended due to pain. Unable to bear weight through the extremity without pain causing difficulty with bed mobility.      OBJECTIVE   remeasurements as noted in attached daily treatment note    Outcome Measures: (Outcome Scoring)  Quick Disabilities of the Arm, Shoulder and Hand (QDASH): 37 (11-55); Calculated Score: 59.05 (0-100) (scored 0-100; a higher score indicates greater disability)    ASSESSMENT   Patient approximately 2 months post radial head fracture due to mechanical fall. To date the patient has made gains as expected as reported. Patient continues to have pain with active use of arm and per MD no pushing, pulling or lifting to promote proper healing of the fracture. Patient continues to have impairments and functional deficits as noted. Patient will continue to benefit from skilled care as outlined.      PLAN    Updates to plan of care: Continue x2/week for gentle motion and pain management, progress to strengthening as able per MD recommendations    Goals  1. Patient will be independent with progressed and modified home exercise program. Progressing  2. Decrease pain/symptoms to 0/10 NOT MET  3. Improve involved strength to 4+/5 NOT MET, deferred due to pain  4. Improve involved elbow active range of motion to 140 degrees of flexion and -10 degrees of extension, pain-free NOT MET  through improvements listed above patient will: NOT MET  5. Be able to  and lift a light grocery bag,  steering wheel, perform light home/yard maintenance tasks without    47 y.o. female with the following issues.    1. Cervical cancer screening  We will await results of Pap smear.  - THINPREP PAP WITH HPV; Future    2. IGT (impaired glucose tolerance)  Recheck 6 months  - BASIC METABOLIC PANEL; Future       pain/difficulty  6. Be able to complete functional writing and cap/lid removal without pain/difficulty to improve meal prep   7. Be able to reach at and above shoulder height, behind back with minimal pain/difficulty to improve activities of independent daily living, specifically dressing  8. Quick DASH: Patient will complete form to reflect an improved score from initial score of 75 to less than or equal to 55 to indicate patient reported improvement in function/disability/impairment. NOT MET    Occupational Therapy Daily Treatment    Visit Count: 10  Plan of Care: 6/14/2019 Through: 9/6/2019  Insurance Information:   Name of Insurance: [employer]  Insurance Phone #: [phone]  : [place]     Iontophoresis covered service (03045)? Yes  Aquatics covered service (82924)? Yes  Are compression garments/stocking for lymphedema coverage? Yes      Visit Limit= 60 (combined PT/OT/ST) soft limit; Visits Utilized= 0  Precertification=  Yes- after 8 weeks need to send clinicals to [phone]   Call Ref/Authorization #: [id number]     Referred by: RENETTA Barreto; Next provider visit (if known/scheduled): 7/2/19  Medical Diagnosis (from order):       Diagnosis Information             Diagnosis      V54.12 (ICD-9-CM) - S52.124D (ICD-10-CM) - Closed nondisplaced fracture of head of right radius with routine healing, subsequent encounter                  Treatment Diagnosis: Right elbow symptoms with increased pain/symptoms, impaired posture, impaired strength, impaired range of motion     Date of onset/injury: May 18th 2019    SUBJECTIVE   \"It's just achey\" Tubigrip too uncomfortable - wearing only at nighttime before bed.     Current Pain (0-10 scale): 1/10  Functional Change: Reports no functional changes.     OBJECTIVE   Range of Motion (degrees)    Left Right Right   Date Initial Initial 7/23   Shoulder Flexion (170-180)        Shoulder Extension (50-60)        Shoulder Abduction (170-180)        Shoulder  Adduction (50-75)        Shoulder Internal Rotation ()        Shoulder External Rotation (80-90)        Elbow Flexion (140-150) 145 125* 127*   Elbow Extension (0-10) -5 -24* -20   Forearm Supination (90) 88 65 70   Forearm Pronation (90) 90 90* 90   Wrist Flexion (80) 78 70 75   Wrist Extension (70) 76 65 70   Wrist Radial Deviation (20) WFL WFL    Wrist Ulnar Deviation (45) WFL WFL    Reported in degrees, active range of motion recorded unless noted as AA=active assistive or P=passive; standard testing positions unless otherwise noted, norms included in ( ); *=pain         Only those motions that were assessed are noted    Strength:  Not assessed due to pain.    Treatment   Therapeutic Exercise:   Wrist extensor stretch with elbow at 90 degrees x 4 with 10 second holds  Isolated AROM elbow flexion and extension, returning to neutral between each set x 15 each    AROM wrist flexion/extension x10  AROM forearm supination/pronation in pain free range x 10    Blue sponge squeezes in all rotations with elbow at 90 degrees x 10 each - discomfort with pronation; attempted with arm at side, however discomfort noted.    Manual Therapy:   Soft tissue mobilizations throughout arm and forearm with discomfort at lateral epicondyle. Limited tissue flexibility noted throughout.     Ultrasound ([id number]):  Location: distal upper arm, lateral side  Position: sitting  Duration: 8 minutes Duty Cycle: 100% duty cycle  Frequency: 3.3 Mhz  Intensity: 1.0 W/cm2   Results: no change in symptoms immediately following modality; no adverse reaction to treatment    Skilled input: verbal instruction/cues, tactile instruction/cues    Home Program:   AROM  Wrist extensor/flexor stretch  Blue sponge    Writer verbally educated the patient and received verbal consent from the patient on hand placement, positioning of patient, and techniques to be performed today including clothing adjustments for techniques, therapist position for techniques,  hand placement and palpation for techniques, modality application as described above and how they are pertinent to the patient's plan of care.       Suggestions for next session as indicated: progress per plan of care, elbow pad, A/PROM to elbow and forearm as tolerated, modalities for pain management    ASSESSMENT   Overall fair tolerance for session. Patient reports discomfort throughout lateral elbow limiting tolerance for progression of exercises. Increased active range of motion was noted at the elbow and forearm levels, with less pain this date. Will continue to benefit from skilled OT for pain management, gentle motion and strengthening as able per MD recommendations.     Pain after treatment (patient reported, 0-10 scale): 1/10   Result of above outlined education: Needs reinforcement    THERAPY DAILY BILLING   Insurance: [employer] [facility] 2. N/A    Evaluation Procedures:  No evaluation codes were used on this date of service    Timed Procedures:  Manual Therapy, 15 minutes  Therapeutic Exercise, 15 minutes  Ultrasound, 8 minutes    Untimed Procedures:  No untimed codes were used on this date of service    Total Treatment Time: 38 minutes

## 2019-11-02 ENCOUNTER — HOSPITAL ENCOUNTER (OUTPATIENT)
Dept: RADIOLOGY | Facility: MEDICAL CENTER | Age: 49
End: 2019-11-02
Attending: NURSE PRACTITIONER
Payer: COMMERCIAL

## 2019-11-02 DIAGNOSIS — M79.672 LEFT FOOT PAIN: ICD-10-CM

## 2019-11-02 PROCEDURE — 73650 X-RAY EXAM OF HEEL: CPT | Mod: LT

## 2019-12-07 ENCOUNTER — HOSPITAL ENCOUNTER (OUTPATIENT)
Dept: RADIOLOGY | Facility: MEDICAL CENTER | Age: 49
End: 2019-12-07
Attending: INTERNAL MEDICINE
Payer: COMMERCIAL

## 2019-12-07 DIAGNOSIS — Z12.39 BREAST SCREENING: ICD-10-CM

## 2019-12-07 PROCEDURE — 77063 BREAST TOMOSYNTHESIS BI: CPT

## 2022-01-01 ENCOUNTER — OFFICE VISIT (OUTPATIENT)
Dept: URGENT CARE | Facility: PHYSICIAN GROUP | Age: 52
End: 2022-01-01
Payer: COMMERCIAL

## 2022-01-01 VITALS
SYSTOLIC BLOOD PRESSURE: 144 MMHG | BODY MASS INDEX: 35.34 KG/M2 | WEIGHT: 180 LBS | TEMPERATURE: 97.6 F | HEIGHT: 60 IN | DIASTOLIC BLOOD PRESSURE: 78 MMHG | HEART RATE: 80 BPM | RESPIRATION RATE: 14 BRPM | OXYGEN SATURATION: 99 %

## 2022-01-01 DIAGNOSIS — D18.01 CHERRY ANGIOMA: ICD-10-CM

## 2022-01-01 DIAGNOSIS — R52 BODY ACHES: ICD-10-CM

## 2022-01-01 PROCEDURE — 99203 OFFICE O/P NEW LOW 30 MIN: CPT | Performed by: FAMILY MEDICINE

## 2022-01-01 NOTE — PROGRESS NOTES
Chief Complaint:    Chief Complaint   Patient presents with   • Rash     full body, rodger & rodger vaccine x2 weeks ago, follow up, was seen at  recenltly, vaccine reaction pt states, spreading from injecton site        History of Present Illness:    She got J&J Covid vaccine on 12/20/21 and since then she has noticed red spots on her body. They look like very small cherry angiomas to me, but she reports she is noticing more and more of them each day. She has felt some body aches but no true weakness in extremities. She was seen at [facility] urgent care on 12/29/21 for these concerns, was given Dexamethasone 10 mg p.o. x 1 dose. She does not feel this helped anything.      Past Medical History:    Past Medical History:   Diagnosis Date   • Arthritis     osteo back feet legs arms hands   • Asthma     daily inhaler   • Autism spectrum disorder 3/4/2015    Hochberger's disease  anxiety and depression   • Breath shortness    • Cold     may 1, 16   • Pain    • Snoring      Past Surgical History:    Past Surgical History:   Procedure Laterality Date   • CERVICAL DISK AND FUSION ANTERIOR Right 5/20/2016    Procedure: CERVICAL DISK AND FUSION ANTERIOR C5/6;  Surgeon: Jude Beauchamp III, M.D.;  Location: SURGERY [facility];  Service:    • GYN SURGERY      BTL reversal    • GYN SURGERY      c sec x 5   • TUBAL LIGATION       Social History:    Social History     Socioeconomic History   • Marital status:      Spouse name: Not on file   • Number of children: Not on file   • Years of education: Not on file   • Highest education level: Not on file   Occupational History   • Not on file   Tobacco Use   • Smoking status: Never Smoker   • Smokeless tobacco: Never Used   Substance and Sexual Activity   • Alcohol use: Yes     Comment: Occasionally   • Drug use: No   • Sexual activity: Not on file   Other Topics Concern   • Not on file   Social History Narrative   • Not on file     Social Determinants of Health      Financial Resource Strain:    • Difficulty of Paying Living Expenses: Not on file   Food Insecurity:    • Worried About Running Out of Food in the Last Year: Not on file   • Ran Out of Food in the Last Year: Not on file   Transportation Needs:    • Lack of Transportation (Medical): Not on file   • Lack of Transportation (Non-Medical): Not on file   Physical Activity:    • Days of Exercise per Week: Not on file   • Minutes of Exercise per Session: Not on file   Stress:    • Feeling of Stress : Not on file   Social Connections:    • Frequency of Communication with Friends and Family: Not on file   • Frequency of Social Gatherings with Friends and Family: Not on file   • Attends Judaism Services: Not on file   • Active Member of Clubs or Organizations: Not on file   • Attends Club or Organization Meetings: Not on file   • Marital Status: Not on file   Intimate Partner Violence:    • Fear of Current or Ex-Partner: Not on file   • Emotionally Abused: Not on file   • Physically Abused: Not on file   • Sexually Abused: Not on file   Housing Stability:    • Unable to Pay for Housing in the Last Year: Not on file   • Number of Places Lived in the Last Year: Not on file   • Unstable Housing in the Last Year: Not on file     Family History:    Family History   Problem Relation Age of Onset   • Diabetes Mother    • Diabetes Father    • Heart Disease Maternal Grandmother      Medications:    Current Outpatient Medications on File Prior to Visit   Medication Sig Dispense Refill   • Multiple Vitamin (MULTI VITAMIN PO) Take 1 Tab by mouth every day.     • Cyanocobalamin (VITAMIN B-12 PO) Take 1 Tab by mouth every day.     • Potassium (POTASSIMIN PO) Take 1 Tab by mouth every day.     • ferrous sulfate 325 (65 Fe) MG tablet Take 325 mg by mouth every day.     • PROAIR  (90 Base) MCG/ACT Aero Soln inhalation aerosol Inhale 2 Puffs by mouth every 6 hours as needed for Shortness of Breath. 3 Inhaler 2     No current  facility-administered medications on file prior to visit.     Allergies:    Allergies   Allergen Reactions   • Influenza Virus Vaccine    • Lactose Nausea     No milk to drink.     • Eggs Itching     Also gets GI distress       Vitals:    Vitals:    22 1335   BP: 144/78   Pulse: 80   Resp: 14   Temp: 36.4 °C (97.6 °F)   SpO2: 99%   Weight: 81.6 kg (180 lb)   Height: 1.524 m (5')       Physical Exam:    Constitutional: Vital signs reviewed. Appears well-developed and well-nourished. No acute distress.   Eyes: Sclera white, conjunctivae clear.   ENT: External ears normal. Hearing normal.   Neck: Neck supple.   Pulmonary/Chest: Respirations non-labored.   Musculoskeletal: Normal gait. No muscular atrophy or weakness.  Neurological: Alert and oriented to person, place, and time. Muscle tone normal. Coordination normal.   Skin: Few scattered focal very small red dots on arms and chest - look like cherry angiomas to me.  Psychiatric: Normal mood and affect. Behavior is normal. Judgment and thought content normal.       Medical Decision Makin. Cherry angioma    2. Body aches       Work note given - excuse from work for partial day 22. She may return to work today 22.    Discussed with her DDX, management options, and risks, benefits, and alternatives to treatment plan agreed upon.    She got J&J Covid vaccine on 21 and since then she has noticed red spots on her body. They look like very small cherry angiomas to me, but she reports she is noticing more and more of them each day. She has felt some body aches but no true weakness in extremities. She was seen at [facility] urgent care on 21 for these concerns, was given Dexamethasone 10 mg p.o. x 1 dose. She does not feel this helped anything.    Few scattered focal very small red dots on arms and chest - look like cherry angiomas to me.    ? if these skin concerns are related to J&J Covid vaccine received on 21. They look like cherry  angiomas to me and other than cosmetic concern, they do not bother her. OK to further observe these.    May take over-the-counter Ibuprofen (Motrin or Advil) OR Naproxen (Aleve) as needed for body aches for anti-inflammatory effect.    She will return if anything worsens, change in symptoms, or any other concerns.

## 2022-01-01 NOTE — LETTER
January 1, 2022         Patient: Mirta Hawk   YOB: 1970   Date of Visit: 1/1/2022           To Whom it May Concern:    Mirta Hawk was seen in my clinic on 1/1/2022.     Please excuse from work for partial day 1/1/22 due to visit.    She may return to work today 1/1/22.    If you have any questions or concerns, please don't hesitate to call.        Sincerely,           Mariusz Smith M.D.  Electronically Signed

## 2022-01-07 ENCOUNTER — OFFICE VISIT (OUTPATIENT)
Dept: URGENT CARE | Facility: PHYSICIAN GROUP | Age: 52
End: 2022-01-07
Payer: COMMERCIAL

## 2022-01-07 VITALS
WEIGHT: 165 LBS | HEART RATE: 74 BPM | OXYGEN SATURATION: 97 % | BODY MASS INDEX: 30.36 KG/M2 | DIASTOLIC BLOOD PRESSURE: 86 MMHG | TEMPERATURE: 97.6 F | SYSTOLIC BLOOD PRESSURE: 152 MMHG | RESPIRATION RATE: 12 BRPM | HEIGHT: 62 IN

## 2022-01-07 DIAGNOSIS — R03.0 ELEVATED BLOOD PRESSURE READING: ICD-10-CM

## 2022-01-07 DIAGNOSIS — R07.9 CHEST PAIN, UNSPECIFIED TYPE: ICD-10-CM

## 2022-01-07 PROCEDURE — 99215 OFFICE O/P EST HI 40 MIN: CPT | Performed by: STUDENT IN AN ORGANIZED HEALTH CARE EDUCATION/TRAINING PROGRAM

## 2022-01-07 PROCEDURE — 93000 ELECTROCARDIOGRAM COMPLETE: CPT | Performed by: STUDENT IN AN ORGANIZED HEALTH CARE EDUCATION/TRAINING PROGRAM

## 2022-01-07 ASSESSMENT — PAIN SCALES - GENERAL: PAINLEVEL: 8=MODERATE-SEVERE PAIN

## 2022-01-08 NOTE — PROGRESS NOTES
Subjective:   CHIEF COMPLAINT  Chief Complaint   Patient presents with   • Arm Pain     left, on and off for a week pt states       HPI  Mirta Hawk is a 51 y.o. female who presents with a chief complaint of left arm pain which developed 1 week ago. Says the symptoms radiate proximally up her shoulder into her chest and neck. Upon further questioning the patient reports she has been experiencing chest pain that feels like someone is sitting on her chest associated with shortness of breath. Patient says there are no triggers or aggravating factors. Symptoms are not exertional. She has tried taking Motrin which has helped with the arm pain. Symptoms have been constant since onset and says she is currently experiencing chest pain.    REVIEW OF SYSTEMS  General: no fever or chills  GI: no nausea or vomiting  See HPI for further details.    PAST MEDICAL HISTORY  Patient Active Problem List    Diagnosis Date Noted   • Chronic neck pain 05/21/2018   • Annular tear of cervical disc 05/20/2016   • Autism spectrum disorder 03/04/2015   • Asthma, mild intermittent 12/22/2014       SURGICAL HISTORY   has a past surgical history that includes tubal ligation; gyn surgery; gyn surgery; and cervical disk and fusion anterior (Right, 5/20/2016).    ALLERGIES  Allergies   Allergen Reactions   • Influenza Virus Vaccine    • Lactose Nausea     No milk to drink.     • Eggs Itching     Also gets GI distress       CURRENT MEDICATIONS  Home Medications     Reviewed by Felice Cedeno D.O. (Physician) on 01/07/22 at 1857  Med List Status: <None>   Medication Last Dose Status   Cyanocobalamin (VITAMIN B-12 PO) Taking Active   ferrous sulfate 325 (65 Fe) MG tablet Taking Active   Multiple Vitamin (MULTI VITAMIN PO) Taking Active   Potassium (POTASSIMIN PO) Taking Active   PROAIR  (90 Base) MCG/ACT Aero Soln inhalation aerosol PRN Active                SOCIAL HISTORY  Social History     Tobacco Use   • Smoking  "status: Never Smoker   • Smokeless tobacco: Never Used   Substance and Sexual Activity   • Alcohol use: Yes     Comment: Occasionally   • Drug use: No   • Sexual activity: Not on file       FAMILY HISTORY  Family History   Problem Relation Age of Onset   • Diabetes Mother    • Diabetes Father    • Heart Disease Maternal Grandmother           Objective:   PHYSICAL EXAM  VITAL SIGNS: /86   Pulse 74   Temp 36.4 °C (97.6 °F)   Resp 12   Ht 1.575 m (5' 2\")   Wt 74.8 kg (165 lb)   BMI 30.18 kg/m²     Gen: no acute distress, normal voice  Skin: dry, intact, moist mucosal membranes  Lungs: CTAB w/ symmetric expansion  CV: RRR w/o murmurs or clicks  Psych: normal affect, normal judgement, alert, awake    EC2022  76 bpm. NSR  Normal axis  No P wave abnormalities  No ST segment elevation or depression  No T wave abnormalities.   No pathologic Q waves or acute ischemic changes  Good R wave progression  No changes from EKG in     Assessment/Plan:     1. Chest pain, unspecified type  EKG - Clinic Performed   2. Elevated blood pressure reading     51-year-old female with left-sided chest pain feels like someone sitting her chest associated with shortness of breath and pain radiating down her left arm. EKG was negative for any acute ischemic changes however certainly the symptoms are rather concerning so she was sent to the emergency room to receive a higher level of evaluation than what can be provided in an urgent care setting. Patient verbalized understanding states she will do so. Says she will have her  drive her to the ED. Patient understood everything discussed. All questions were answered.    Differential diagnosis, natural history, supportive care, and indications for immediate follow-up discussed. All questions answered. Patient agrees with the plan of care.    Follow-up as needed if symptoms worsen or fail to improve to PCP, Urgent care or Emergency Room.    Please note that this dictation " was created using voice recognition software. I have made a reasonable attempt to correct obvious errors, but I expect that there are errors of grammar and possibly content that I did not discover before finalizing the note.

## 2022-07-26 ENCOUNTER — OFFICE VISIT (OUTPATIENT)
Dept: URGENT CARE | Facility: PHYSICIAN GROUP | Age: 52
End: 2022-07-26
Payer: COMMERCIAL

## 2022-07-26 ENCOUNTER — HOSPITAL ENCOUNTER (OUTPATIENT)
Dept: LAB | Facility: MEDICAL CENTER | Age: 52
End: 2022-07-26
Attending: PHYSICIAN ASSISTANT
Payer: COMMERCIAL

## 2022-07-26 ENCOUNTER — OCCUPATIONAL MEDICINE (OUTPATIENT)
Dept: URGENT CARE | Facility: PHYSICIAN GROUP | Age: 52
End: 2022-07-26
Payer: COMMERCIAL

## 2022-07-26 ENCOUNTER — APPOINTMENT (OUTPATIENT)
Dept: RADIOLOGY | Facility: IMAGING CENTER | Age: 52
End: 2022-07-26
Attending: PHYSICIAN ASSISTANT
Payer: COMMERCIAL

## 2022-07-26 VITALS
WEIGHT: 161 LBS | DIASTOLIC BLOOD PRESSURE: 82 MMHG | DIASTOLIC BLOOD PRESSURE: 82 MMHG | WEIGHT: 161 LBS | HEART RATE: 72 BPM | RESPIRATION RATE: 16 BRPM | HEIGHT: 62 IN | OXYGEN SATURATION: 92 % | TEMPERATURE: 97.2 F | SYSTOLIC BLOOD PRESSURE: 140 MMHG | HEIGHT: 62 IN | RESPIRATION RATE: 16 BRPM | SYSTOLIC BLOOD PRESSURE: 140 MMHG | BODY MASS INDEX: 29.63 KG/M2 | TEMPERATURE: 97.2 F | OXYGEN SATURATION: 92 % | BODY MASS INDEX: 29.63 KG/M2 | HEART RATE: 72 BPM

## 2022-07-26 DIAGNOSIS — M31.6 TEMPORAL ARTERITIS (HCC): ICD-10-CM

## 2022-07-26 DIAGNOSIS — M79.672 LEFT FOOT PAIN: ICD-10-CM

## 2022-07-26 LAB
CRP SERPL HS-MCNC: 0.37 MG/DL (ref 0–0.75)
ERYTHROCYTE [DISTWIDTH] IN BLOOD BY AUTOMATED COUNT: 41.6 FL (ref 35.9–50)
HCT VFR BLD AUTO: 39.3 % (ref 37–47)
HGB BLD-MCNC: 13 G/DL (ref 12–16)
MCH RBC QN AUTO: 27.5 PG (ref 27–33)
MCHC RBC AUTO-ENTMCNC: 33.1 G/DL (ref 33.6–35)
MCV RBC AUTO: 83.3 FL (ref 81.4–97.8)
PLATELET # BLD AUTO: 228 K/UL (ref 164–446)
PMV BLD AUTO: 10.9 FL (ref 9–12.9)
RBC # BLD AUTO: 4.72 M/UL (ref 4.2–5.4)
WBC # BLD AUTO: 6.3 K/UL (ref 4.8–10.8)

## 2022-07-26 PROCEDURE — 86140 C-REACTIVE PROTEIN: CPT

## 2022-07-26 PROCEDURE — 85652 RBC SED RATE AUTOMATED: CPT

## 2022-07-26 PROCEDURE — 85027 COMPLETE CBC AUTOMATED: CPT

## 2022-07-26 PROCEDURE — 99214 OFFICE O/P EST MOD 30 MIN: CPT | Performed by: PHYSICIAN ASSISTANT

## 2022-07-26 PROCEDURE — 73630 X-RAY EXAM OF FOOT: CPT | Mod: TC,FY,LT | Performed by: RADIOLOGY

## 2022-07-26 PROCEDURE — 36415 COLL VENOUS BLD VENIPUNCTURE: CPT

## 2022-07-26 RX ORDER — PREDNISONE 10 MG/1
60 TABLET ORAL DAILY
Qty: 60 TABLET | Refills: 0 | Status: SHIPPED | OUTPATIENT
Start: 2022-07-26 | End: 2022-08-05

## 2022-07-26 ASSESSMENT — ENCOUNTER SYMPTOMS
CHILLS: 0
HEADACHES: 0
DIARRHEA: 0
NAUSEA: 1
ABDOMINAL PAIN: 0
FEVER: 0
VOMITING: 0
WHEEZING: 0
COUGH: 0
SORE THROAT: 0
SINUS PAIN: 0
EYE DISCHARGE: 0
DIZZINESS: 0
DIAPHORESIS: 0
SHORTNESS OF BREATH: 0
EYE PAIN: 0
CONSTIPATION: 0
EYE REDNESS: 0

## 2022-07-26 ASSESSMENT — PAIN SCALES - GENERAL: PAINLEVEL: 7=MODERATE-SEVERE PAIN

## 2022-07-26 NOTE — PROGRESS NOTES
"Subjective:     Mirta Hawk is a 51 y.o. female who presents for Work-Related Injury ( new DOI: 07/24/2022 dropped frozen chicken on (L) side foot )      DOI: 7/24/2022    ANUJA: Tote fell from [facility] and landed on foot    She presents today with pain over the dorsal aspect of the left foot and pain over the fifth metatarsal.  There is pain with standing and ambulation.  She denies any changes from her baseline foot numbness and tingling.  No lower extremity weakness since the injury.  She has taken Advil for pain.  No secondary occupation.  Does have history of plantar fasciitis.      PMH:   No pertinent past medical history to this problem  MEDS:  Medications were reviewed in EMR  ALLERGIES:  Allergies were reviewed in EMR  SOCHX:  Works as a Digital   FH:   No pertinent family history to this problem       Objective:     BP (!) 140/82   Pulse 72   Temp 36.2 °C (97.2 °F) (Temporal)   Resp 16   Ht 1.575 m (5' 2\")   Wt 73 kg (161 lb)   SpO2 92%   BMI 29.45 kg/m²     Ecchymosis present on the dorsal aspect of the left foot.  Tenderness to palpation over the midshaft of the fifth metatarsal.  Dermatomal sensation intact.  Muscular strength intact.  Full range of motion of all toes.  Vascular function intact.    Assessment/Plan:     Encounter Diagnoses   Name Primary?   • Left foot pain      Plan:  Over-the-counter Tylenol or anti-inflammatories for pain  return to urgent care on 7/29/2022 for reevaluation  work restrictions updated    Differential diagnosis, natural history, supportive care, and indications for immediate follow-up discussed.    Time spent evaluating the patient was 33 minutes which included preparing for the visit, obtaining history, examination, ordering labs/tests/procedures/medications, independent interpretation, discussion of plan, counseling/education, medical information reconciliation, and documentation into chart.     Alcides Quinones PA-C  "

## 2022-07-26 NOTE — PROGRESS NOTES
"  Subjective:     Mirta Hawk  is a 51 y.o. female who presents for Headache (Side of head )       She presents today with pain over the right temporal region x1 day.  She describes this pain as a electrical/shocky feeling.  She does have nausea.  Denies jaw claudication, changes in vision, changes in hearing.  No localized skin rash or vesicles.  No fever/chills/sweats, chest pain, shortness of breath.  No new changes to medications or lifestyle changes.  No recent illnesses.      Review of Systems   Constitutional: Negative for chills, diaphoresis, fever and malaise/fatigue.   HENT: Negative for congestion, ear discharge, sinus pain and sore throat.         Pain over the right temporal region   Eyes: Negative for pain, discharge and redness.   Respiratory: Negative for cough, shortness of breath and wheezing.    Cardiovascular: Negative for chest pain.   Gastrointestinal: Positive for nausea. Negative for abdominal pain, constipation, diarrhea and vomiting.   Genitourinary: Negative for dysuria, frequency and urgency.   Neurological: Negative for dizziness and headaches.      Allergies   Allergen Reactions   • Influenza Virus Vaccine    • Lactose Nausea     No milk to drink.     • Eggs Itching     Also gets GI distress     Past Medical History:   Diagnosis Date   • Arthritis     osteo back feet legs arms hands   • Asthma     daily inhaler   • Autism spectrum disorder 3/4/2015    Hochberger's disease  anxiety and depression   • Breath shortness    • Cold     may 1, 16   • Pain    • Snoring         Objective:   BP (!) 140/82   Pulse 72   Temp 36.2 °C (97.2 °F) (Temporal)   Resp 16   Ht 1.575 m (5' 2\")   Wt 73 kg (161 lb)   SpO2 92%   BMI 29.45 kg/m²   Physical Exam  Vitals and nursing note reviewed.   Constitutional:       General: She is not in acute distress.     Appearance: Normal appearance. She is not ill-appearing, toxic-appearing or diaphoretic.   HENT:      Head: Normocephalic. "      Comments: Severe tenderness to palpation over right temporal region.  There is localized swelling over the right temporal region.     Right Ear: Tympanic membrane, ear canal and external ear normal. There is no impacted cerumen.      Left Ear: Tympanic membrane, ear canal and external ear normal. There is no impacted cerumen.      Nose: No congestion or rhinorrhea.      Mouth/Throat:      Mouth: Mucous membranes are moist.      Pharynx: No oropharyngeal exudate or posterior oropharyngeal erythema.   Eyes:      General:         Right eye: No discharge.         Left eye: No discharge.      Conjunctiva/sclera: Conjunctivae normal.   Cardiovascular:      Rate and Rhythm: Normal rate and regular rhythm.   Pulmonary:      Effort: Pulmonary effort is normal. No respiratory distress.      Breath sounds: Normal breath sounds. No stridor. No wheezing or rhonchi.   Musculoskeletal:      Cervical back: Neck supple.   Lymphadenopathy:      Cervical: No cervical adenopathy.   Neurological:      General: No focal deficit present.      Mental Status: She is alert and oriented to person, place, and time.   Psychiatric:         Mood and Affect: Mood normal.         Behavior: Behavior normal.         Thought Content: Thought content normal.         Judgment: Judgment normal.             Diagnostic testing:    CBC, ESR, CRP-pending    Assessment/Plan:     Encounter Diagnoses   Name Primary?   • Temporal arteritis (HCC)           Plan for care for today's complaint includes Prednisone 60 mg daily for prophylactic treatment of possible temporal arteritis.  We will perform CBC, ESR, CRP for further evaluation; did discuss with the patient that if these labs do show signs of temporal arteritis and we will refer her to the emergency department at that time to undergo further evaluation and temporal artery biopsy.  We will contact patient via phone call to discuss the results.  At this time she is not experiencing vision  changes.  Prescription for Prednisone 60 mg provided.    See AVS Instructions below for written guidance provided to patient on after-visit management and care in addition to our verbal discussion during the visit.    Please note that this dictation was created using voice recognition software. I have attempted to correct all errors, but there may be sound-alike, spelling, grammar and possibly content errors that I did not discover before finalizing the note.    Alcides Quinones PA-C

## 2022-07-26 NOTE — LETTER
"EMPLOYEE’S CLAIM FOR COMPENSATION/ REPORT OF INITIAL TREATMENT  FORM C-4    EMPLOYEE’S CLAIM - PROVIDE ALL INFORMATION REQUESTED   First Name  Mirta Last Name  Dawood Hawk Birthdate                    1970                Sex  female Claim Number (Insurer’s Use Only)    Home Address  [address]  Age  51 y.o. 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 (Be specific an answer in detail. Use additional sheet if necessary)  Tote w frozen chicken fell on left foot   If you believe that you have an occupational disease, when did you first have knowledge of the disability and it relationship to your employment?   Witnesses to the Accident  n/a camera      Nature of Injury or Occupational Disease  Workers' Compensation  Part(s) of Body Injured or Affected  Foot (L), ,     I certify that the above is true and correct to the best of my knowledge and that I have provided this information in order to obtain the " benefits of [place]  I hereby authorize any physician, chiropractor, surgeon, practitioner, or other person, any hospital, including [facility] or [facility], any medical service organization, any insurance company, or other institution or organization to release to each other, any medical or other information, including benefits paid or payable, pertinent to this injury or disease, except information relative to diagnosis, treatment and/or counseling for AIDS, psychological conditions, alcohol or controlled substances, for which I must give specific authorization.  A Photostat of this authorization shall be as valid as the original.     Date 7/26/22   Place [facility] Employee’s Original or  *Electronic Signature   THIS REPORT MUST BE COMPLETED AND MAILED WITHIN 3 WORKING DAYS OF TREATMENT   Place  [facility]  Name of Facility  [place]   Date  7/26/2022 Diagnosis and Description of Injury or Occupational Disease  (M79.672) Left foot pain Is there evidence the injured employee was under the influence of alcohol and/or another controlled substance at the time of accident?  ? No ? Yes (if yes, please explain)    Hour  2:31 PM   The encounter diagnosis was Left foot pain. No   Treatment  Over-the-counter Tylenol or anti-inflammatories for pain  return to urgent care on 7/29/2022 for reevaluation  work restrictions updated  Have you advised the patient to remain off work five days or     more?    X-Ray Findings    Comments:IMPRESSION: No radiographic evidence of acute displaced fracture or subluxation. New plantar calcaneal   ? Yes Indicate dates:   From   To      From information given by the employee, together with medical evidence, can        you directly connect this injury or occupational disease as job incurred?  Yes ? No If no, is the injured employee capable of:  " ? full duty  No ? modified duty  Yes   Is additional medical care by a physician indicated?  Yes  Comments:Return to the urgent care on 7/29/2022 for reevaluation If Modified Duty, Specify any Limitations / Restrictions  See D39   Do you know of any previous injury or disease contributing to this condition or occupational disease?  ? Yes ? No (Explain if yes)                          No   Date  7/26/2022 Print Health Care Provider's   Shelly Quinones P.A.-C. I certify the employer’s copy of  this form was mailed on:   Address  [address] Insurer’s Use Only     [facility] Zip  [address]    Provider’s Tax ID Number  [id number] Telephone  Dept: [phone]             Health Care Provider’s Original or Electronic Signature  e-SHELLY Wise P.A.-C. Degree (MD,DO, DC,PAMALIHA,APRN)         * Complete and attach Release of Information (Form C-4A) when injured employee signs C-4 Form electronically  ORIGINAL - TREATING HEALTHCARE PROVIDER PAGE 2 - INSURER/TPA PAGE 3 - EMPLOYER PAGE 4 - EMPLOYEE             Form C-4 (rev.08/21)           BRIEF DESCRIPTION OF RIGHTS AND BENEFITS  (Pursuant to [place] 616C.050)    Notice of Injury or Occupational Disease (Incident Report Form C-1): If an injury or occupational disease (OD) arises out of and in the course of employment, you must provide written notice to your employer as soon as practicable, but no later than 7 days after the accident or OD. Your employer shall maintain a sufficient supply of the required forms.    Claim for Compensation (Form C-4): If medical treatment is sought, the form C-4 is available at the place of initial treatment. A completed \"Claim for Compensation\" (Form C-4) must be filed within 90 days after an accident or OD. The treating physician or chiropractor must, within 3 working days after treatment, complete and mail to the employer, the employer's insurer and third-party , the Claim for Compensation.    Medical " Treatment: If you require medical treatment for your on-the-job injury or OD, you may be required to select a physician or chiropractor from a list provided by your workers’ compensation insurer, if it has contracted with an Organization for Managed Care (MCO) or Preferred Provider Organization (PPO) or providers of health care. If your employer has not entered into a contract with an MCO or PPO, you may select a physician or chiropractor from the Panel of Physicians and Chiropractors. Any medical costs related to your industrial injury or OD will be paid by your insurer.    Temporary Total Disability (TTD): If your doctor has certified that you are unable to work for a period of at least 5 consecutive days, or 5 cumulative days in a 20-day period, or places restrictions on you that your employer does not accommodate, you may be entitled to TTD compensation.    Temporary Partial Disability (TPD): If the wage you receive upon reemployment is less than the compensation for TTD to which you are entitled, the insurer may be required to pay you TPD compensation to make up the difference. TPD can only be paid for a maximum of 24 months.    Permanent Partial Disability (PPD): When your medical condition is stable and there is an indication of a PPD as a result of your injury or OD, within 30 days, your insurer must arrange for an evaluation by a rating physician or chiropractor to determine the degree of your PPD. The amount of your PPD award depends on the date of injury, the results of the PPD evaluation, your age and wage.    Permanent Total Disability (PTD): If you are medically certified by a treating physician or chiropractor as permanently and totally disabled and have been granted a PTD status by your insurer, you are entitled to receive monthly benefits not to exceed 66 2/3% of your average monthly wage. The amount of your PTD payments is subject to reduction if you previously received a lump-sum PPD  award.    Vocational Rehabilitation Services: You may be eligible for vocational rehabilitation services if you are unable to return to the job due to a permanent physical impairment or permanent restrictions as a result of your injury or occupational disease.    Transportation and Per Jenn Reimbursement: You may be eligible for travel expenses and per jenn associated with medical treatment.    Reopening: You may be able to reopen your claim if your condition worsens after claim closure.     Appeal Process: If you disagree with a written determination issued by the insurer or the insurer does not respond to your request, you may appeal to the Department of Administration, , by following the instructions contained in your determination letter. You must appeal the determination within 70 days from the date of the determination letter at [address], [address], [place], [place] [address], or [address], [place], [place] [address]. If you disagree with the  decision, you may appeal to the Department of Administration, . You must file your appeal within 30 days from the date of the  decision letter at [address], [address], [place], [place] [address], or [address], [place], [place] [address]. If you disagree with a decision of an , you may file a petition for judicial review with the District Court. You must do so within 30 days of the Appeal Officer’s decision. You may be represented by an  at your own expense or you may contact the [facility] for possible representation.    [place]  for Injured Workers (NAIW): If you disagree with a  decision, you may request that NAIW represent you without charge at an  Hearing. For information regarding denial of benefits, you may contact the [facility] at: [address], [address], [place] [address], [phone])  [phone], or [address], [address], [place] [address], [phone]    To File a Complaint with the Division: If you wish to file a complaint with the  of the Division of Industrial Relations (DIR),  please contact the Workers’ Compensation Section, [address], [address], [place], [place] [address], telephone (048) [phone], or [address], [address], [place], [place] [address], telephone (600) [phone].    For assistance with Workers’ Compensation Issues: You may contact the [facility] Office for Consumer Health Assistance, [address], [address], [place], [place] [address], Toll Free 1-559.634.6256, Web site: http://[facility].[place].gov/Programs/TACO E-mail: taco@[facility].nv.[facility]              __________________________________________________________________                                    _____7/26/22____________            Employee Name / Signature                                                                                                                            Date                                                                                                                                                                                                                              D-2 (rev. 10/20)

## 2022-07-26 NOTE — LETTER
[address] [place] [address]  Phone:  [phone] - Fax:  [phone]   [facility] Progress Report and Disability Certification  Date of Service: 7/26/2022   No Show:  No  Date / Time of Next Visit:     Claim Information   Patient Name: Mirta Hawk  Claim Number:     Employer: [employer]  Date of Injury: 7/24/2022     Insurer / TPA: [employer]  ID / SSN:     Occupation: Digital   Diagnosis: The encounter diagnosis was Left foot pain.    Medical Information   Related to Industrial Injury? Yes    Subjective Complaints:  DOI: 7/24/2022    ANUJA: Tote fell from [facility] and landed on foot    She presents today with pain over the dorsal aspect of the left foot and pain over the fifth metatarsal.  There is pain with standing and ambulation.  She denies any changes from her baseline foot numbness and tingling.  No lower extremity weakness since the injury.  She has taken Advil for pain.  No secondary occupation.  Does have history of plantar fasciitis.     Objective Findings: Ecchymosis present on the dorsal aspect of the left foot.  Tenderness to palpation over the midshaft of the fifth metatarsal.  Dermatomal sensation intact.  Muscular strength intact.  Full range of motion of all toes.  Vascular function intact.   Pre-Existing Condition(s):     Assessment:   Initial Visit    Status: Additional Care Required  Comments:Return to urgent care on 7/29/2022 for reevaluation  Permanent Disability:No    Plan:   Comments:Over-the-counter Tylenol or anti-inflammatories for pain, return to urgent care on 7/29/2022 for reevaluation, work restrictions updated    Diagnostics: X-ray  Comments:IMPRESSION: No radiographic evidence of acute displaced fracture or subluxation. New plantar calcaneal    Comments:       Disability Information   Status: Released to Restricted Duty    From:     Through:   Restrictions are:  Temporary   Physical Restrictions   Sitting:    Standing:    Stooping:    Bending:      Squatting:    Walking:    Climbing:    Pushing:      Pulling:    Other:    Reaching Above Shoulder (L):   Reaching Above Shoulder (R):       Reaching Below Shoulder (L):    Reaching Below Shoulder (R):      Not to exceed Weight Limits   Carrying(hrs):   Weight Limit(lb):   Lifting(hrs):   Weight  Limit(lb):     Comments: Sedentary work, no prolonged standing or walking.    Repetitive Actions   Hands: i.e. Fine Manipulations from Grasping:     Feet: i.e. Operating Foot Controls:     Driving / Operate Machinery:     Health Care Provider’s Original or Electronic Signature  Alcides Quinones P.A.-C. Health Care Provider’s Original or Electronic Signature    Wing Higgins MD         Clinic Name / Location: [address] [address] Clinic Phone Number: Dept: [phone]   Appointment Time: 1:20 Pm Visit Start Time: 2:31 PM   Check-In Time:  1:09 Pm Visit Discharge Time:  3:38 PM   Original-Treating Physician or Chiropractor    Page 2-Insurer/TPA    Page 3-Employer    Page 4-Employee

## 2022-07-27 LAB — ERYTHROCYTE [SEDIMENTATION RATE] IN BLOOD BY WESTERGREN METHOD: 8 MM/HOUR (ref 0–25)

## 2023-04-27 ENCOUNTER — RESEARCH ENCOUNTER (OUTPATIENT)
Dept: MEDICAL GROUP | Facility: PHYSICIAN GROUP | Age: 53
End: 2023-04-27
Payer: COMMERCIAL

## 2023-04-27 DIAGNOSIS — Z00.6 RESEARCH STUDY PATIENT: ICD-10-CM

## 2023-06-08 LAB
APOB+LDLR+PCSK9 GENE MUT ANL BLD/T: NOT DETECTED
BRCA1+BRCA2 DEL+DUP + FULL MUT ANL BLD/T: NOT DETECTED
MLH1+MSH2+MSH6+PMS2 GN DEL+DUP+FUL M: NOT DETECTED

## 2024-05-13 ENCOUNTER — OCCUPATIONAL MEDICINE (OUTPATIENT)
Dept: URGENT CARE | Facility: PHYSICIAN GROUP | Age: 54
End: 2024-05-13
Payer: COMMERCIAL

## 2024-05-13 VITALS
OXYGEN SATURATION: 98 % | DIASTOLIC BLOOD PRESSURE: 64 MMHG | RESPIRATION RATE: 18 BRPM | HEART RATE: 75 BPM | TEMPERATURE: 97.7 F | WEIGHT: 179 LBS | BODY MASS INDEX: 32.94 KG/M2 | SYSTOLIC BLOOD PRESSURE: 128 MMHG | HEIGHT: 62 IN
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
## 2024-05-13 DIAGNOSIS — S39.011A STRAIN OF ABDOMINAL WALL, INITIAL ENCOUNTER: ICD-10-CM

## 2024-05-13 PROCEDURE — 99214 OFFICE O/P EST MOD 30 MIN: CPT

## 2024-05-13 PROCEDURE — 3078F DIAST BP <80 MM HG: CPT

## 2024-05-13 PROCEDURE — 3074F SYST BP LT 130 MM HG: CPT

## 2024-05-13 NOTE — PROGRESS NOTES
Subjective:   Mirta Hawk is a very pleasant 53 y.o. female who presents for:    Chief Complaint   Patient presents with    Muscle Pain     NEW W/C LLQ pain. Was lifting a car battery in and out of a shopping cart last night. Hot burning sensation.          HPI:    DOI: 5/11/24  ANUJA: The patient presents to the urgent care for after sustaining an injury while at work.  She endorses that she was attempting to move a car battery from a shopping cart and put the battery into another cart, when she began to experience pain in her left lower quadrant.  Sensation is described as hot and burning.  Non-radiating. The pain is worse with movement, especially twisting and lifting. The pain is relieved with rest.  She has attempted to wear supportive shape wear to work yesterday, which was mildly helpful at improving her symptoms. She has been taking Tylenol for the pain and inflammation. No spasms, back pain, mass. Denies previous injury.  No second job.    ROS:    Review of Systems   Musculoskeletal:         Left-sided abdominal wall pain   All other systems reviewed and are negative.      Medications:      Current Outpatient Medications   Medication Sig    Multiple Vitamin (MULTI VITAMIN PO) Take 1 Tab by mouth every day.    Cyanocobalamin (VITAMIN B-12 PO) Take 1 Tab by mouth every day.    Potassium (POTASSIMIN PO) Take 1 Tab by mouth every day.    ferrous sulfate 325 (65 Fe) MG tablet Take 325 mg by mouth every day.    PROAIR  (90 Base) MCG/ACT Aero Soln inhalation aerosol Inhale 2 Puffs by mouth every 6 hours as needed for Shortness of Breath. (Patient not taking: Reported on 5/13/2024)       Allergies:     Allergies   Allergen Reactions    Influenza Virus Vaccine     Lactose Nausea     No milk to drink.      Eggs Itching     Also gets GI distress       Problem List:     Patient Active Problem List   Diagnosis    Asthma, mild intermittent    Autism spectrum disorder    Annular tear of cervical  disc    Chronic neck pain       Surgical History:    Past Surgical History:   Procedure Laterality Date    CERVICAL DISK AND FUSION ANTERIOR Right 5/20/2016    Procedure: CERVICAL DISK AND FUSION ANTERIOR C5/6;  Surgeon: Jude Beauchamp III, M.D.;  Location: SURGERY [facility];  Service:     GYN SURGERY      BTL reversal     GYN SURGERY      c sec x 5    TUBAL LIGATION         Past Social Hx:     Social History     Socioeconomic History    Marital status:    Tobacco Use    Smoking status: Never    Smokeless tobacco: Never   Substance and Sexual Activity    Alcohol use: Yes     Comment: Occasionally    Drug use: No        Past Family Hx:      Family History   Problem Relation Age of Onset    Diabetes Mother     Diabetes Father     Heart Disease Maternal Grandmother        Problem list, medications, and allergies reviewed by myself today in Epic.     Objective:     Vitals:    05/13/24 1525   BP: 128/64   Pulse: 75   Resp: 18   Temp: 36.5 °C (97.7 °F)   SpO2: 98%       Physical Exam  Vitals reviewed.   Constitutional:       General: She is not in acute distress.     Appearance: Normal appearance. She is not ill-appearing, toxic-appearing or diaphoretic.   HENT:      Head: Normocephalic and atraumatic.      Right Ear: External ear normal.      Left Ear: External ear normal.      Nose: Nose normal.      Mouth/Throat:      Mouth: Mucous membranes are moist.   Eyes:      Extraocular Movements: Extraocular movements intact.      Conjunctiva/sclera: Conjunctivae normal.      Pupils: Pupils are equal, round, and reactive to light.   Cardiovascular:      Rate and Rhythm: Normal rate and regular rhythm.   Pulmonary:      Effort: Pulmonary effort is normal.   Abdominal:          Comments: TTP over the left lower aspect of the abdominal wall.  No palpable masses, reducible areas, overlying skin changes, or deformity.  The pain is reproduced with mild palpation to the area.   Musculoskeletal:         General: Normal  range of motion.      Cervical back: Normal range of motion.   Skin:     General: Skin is warm and dry.   Neurological:      General: No focal deficit present.      Mental Status: She is alert and oriented to person, place, and time.   Psychiatric:         Mood and Affect: Mood normal.         Behavior: Behavior normal.         Thought Content: Thought content normal.                 Assessment/Plan:     Diagnosis and associated orders:     1. Strain of abdominal wall, initial encounter  - US-HERNIA ABDOMEN; Future  - Referral to Radiology  - Referral to Occupational Medicine    2. Work related injury  - Referral to Radiology          Comments/MDM:     See scanned C-4 and D-39  The patient presents to the urgent care after sustaining an injury to the abdominal wall after lifting a heavy battery while at work.  She is experience moderate left lower abdominal wall discomfort that is exacerbated with twisting and forward bending.  Pain reproducible with palpation.  Ultrasound of the abdomen ordered to rule out hernia.  Referrals placed to radiology and occupational medicine  Work restrictions are in place.  Advised light duty.  No heavy lifting greater than 10 pounds.  Supportive measures discussed  Return to clinic in 7 days         All questions answered. Patient verbalized understanding and is in agreement with this plan of care.     If symptoms are worsening or not improving in 3-5 days, follow-up with PCP or return to UC. Differential diagnosis, natural history, and supportive care discussed. AVS handout given and reviewed with patient. Patient educated on red flags and when to seek treatment back in ED or UC.     I personally reviewed prior external notes and test results pertinent to today's visit.  I have independently reviewed and interpreted all diagnostics ordered during this urgent care visit.     This dictation has been created using voice recognition software. The accuracy of the dictation is limited by  the abilities of the software. I expect there may be some errors of grammar and possibly content. I made every attempt to manually correct the errors within my dictation. However, errors related to voice recognition software may still exist and should be interpreted within the appropriate context.    This note was electronically signed by VIOLETA Presley

## 2024-05-18 ENCOUNTER — OCCUPATIONAL MEDICINE (OUTPATIENT)
Dept: URGENT CARE | Facility: PHYSICIAN GROUP | Age: 54
End: 2024-05-18
Payer: COMMERCIAL

## 2024-05-18 VITALS
OXYGEN SATURATION: 97 % | WEIGHT: 178.4 LBS | HEART RATE: 80 BPM | TEMPERATURE: 98.1 F | BODY MASS INDEX: 32.83 KG/M2 | RESPIRATION RATE: 20 BRPM | HEIGHT: 62 IN | SYSTOLIC BLOOD PRESSURE: 158 MMHG | DIASTOLIC BLOOD PRESSURE: 98 MMHG

## 2024-05-18 DIAGNOSIS — S39.011D STRAIN OF ABDOMINAL WALL, SUBSEQUENT ENCOUNTER: ICD-10-CM

## 2024-05-18 DIAGNOSIS — R10.9 LEFT LATERAL ABDOMINAL PAIN: ICD-10-CM

## 2024-05-18 PROCEDURE — 3077F SYST BP >= 140 MM HG: CPT | Performed by: NURSE PRACTITIONER

## 2024-05-18 PROCEDURE — 1125F AMNT PAIN NOTED PAIN PRSNT: CPT | Performed by: NURSE PRACTITIONER

## 2024-05-18 PROCEDURE — 99213 OFFICE O/P EST LOW 20 MIN: CPT | Performed by: NURSE PRACTITIONER

## 2024-05-18 PROCEDURE — 3080F DIAST BP >= 90 MM HG: CPT | Performed by: NURSE PRACTITIONER

## 2024-05-18 ASSESSMENT — PAIN SCALES - GENERAL: PAINLEVEL: 7=MODERATE-SEVERE PAIN

## 2024-05-18 NOTE — PROGRESS NOTES
"  Chief Complaint   Patient presents with    Follow-Up      DOI: 05/11/2024    Abdominal Pain     Left side       HISTORY OF PRESENT ILLNESS: Patient is a pleasant 53 y.o. female who presents to urgent care today with a work comp follow-up.  Date of injury 5/11/2024.  Patient was lifting heavy item while at work and shortly thereafter noticed left lateral abdominal pain.  Patient has had constant pain since.  Denies any fever, changes in stools or urination.  She has tried ibuprofen without much improvement.  An ultrasound has been ordered but patient has yet to have completed.      PMH: No pertinent past medical history to this problem  MEDS: Medications were reviewed in Epic  ALLERGIES: Allergies were reviewed in Epic  FH: No pertinent family history to this problem      ROS:  Review of Systems   Constitutional: Negative for fever, chills, weight loss, malaise, and fatigue.   HENT: Negative for ear pain, nosebleeds, congestion, sore throat and neck pain.    Eyes: Negative for vision changes.   Neuro: Negative for headache, sensory changes, weakness, seizure, LOC.   Cardiovascular: Negative for chest pain, palpitations, orthopnea and leg swelling.   Respiratory: Negative for cough, sputum production, shortness of breath and wheezing.   Gastrointestinal: Positive for abdominal pain.   Negative for nausea, vomiting or diarrhea.   Genitourinary: Negative for dysuria, urgency and frequency.  Musculoskeletal: Negative for falls, neck pain, back pain, joint pain, myalgias.   Skin: Negative for rash, diaphoresis.     Exam:  BP (!) 158/98 (BP Location: Left arm, Patient Position: Sitting, BP Cuff Size: Adult)   Pulse 80   Temp 36.7 °C (98.1 °F) (Temporal)   Resp 20   Ht 1.575 m (5' 2\")   Wt 80.9 kg (178 lb 6.4 oz)   SpO2 97%   General: well-nourished, well-developed female in NAD  Head: normocephalic, atraumatic  Eyes: PERRLA, no conjunctival injection, acuity grossly intact, lids normal.  Ears: normal shape and " symmetry, no tenderness, no discharge. External canals are without any significant edema or erythema. Tympanic membranes are without any inflammation, no effusion. Gross auditory acuity is intact.  Nose: symmetrical without tenderness, no discharge.  Mouth/Throat: reasonable hygiene, no erythema, exudates or tonsillar enlargement.  Neck: no masses, range of motion within normal limits, no tracheal deviation. No obvious thyroid enlargement.   Lymph: no cervical adenopathy. No supraclavicular adenopathy.   Neuro: alert and oriented. Cranial nerves 1-12 grossly intact. No sensory deficit.   Cardiovascular: regular rate and rhythm. No edema.  Pulmonary: no distress. Chest is symmetrical with respiration, no wheezes, crackles, or rhonchi.   Abdomen: soft, left lateral abdominal tenderness, left lower quadrant tenderness, no guarding, no hepatosplenomegaly.  Musculoskeletal: no clubbing, appropriate muscle tone, gait is stable.  Skin: warm, dry, intact, no clubbing, no cyanosis, no rashes.   Psych: appropriate mood, affect, judgement.         Assessment/Plan:  1. Left lateral abdominal pain        2. Strain of abdominal wall, subsequent encounter            Ultrasound ordered for patient to have completed tomorrow, OTC NSAIDs, work restrictions, return to clinic in 3 days for reevaluation and review of ultrasound.  Supportive care, differential diagnoses, and indications for immediate follow-up discussed with patient.   Pathogenesis of diagnosis discussed including typical length and natural progression.   Instructed to return to clinic or nearest emergency department sooner for any change in condition, further concerns, or worsening of symptoms.  Patient states understanding of the plan of care and discharge instructions.          Please note that this dictation was created using voice recognition software. I have made every reasonable attempt to correct obvious errors, but I expect that there are errors of grammar and  possibly content that I did not discover before finalizing the note.  Previous clinic visit encounter reviewed and considered in medical decision making today.       MADAN Leos.

## 2024-05-19 ENCOUNTER — HOSPITAL ENCOUNTER (OUTPATIENT)
Dept: RADIOLOGY | Facility: MEDICAL CENTER | Age: 54
End: 2024-05-19
Payer: COMMERCIAL

## 2024-05-19 DIAGNOSIS — S39.011A STRAIN OF ABDOMINAL WALL, INITIAL ENCOUNTER: ICD-10-CM

## 2024-05-24 ENCOUNTER — OCCUPATIONAL MEDICINE (OUTPATIENT)
Dept: URGENT CARE | Facility: PHYSICIAN GROUP | Age: 54
End: 2024-05-24
Payer: COMMERCIAL

## 2024-05-24 VITALS
OXYGEN SATURATION: 98 % | RESPIRATION RATE: 20 BRPM | DIASTOLIC BLOOD PRESSURE: 76 MMHG | HEIGHT: 62 IN | HEART RATE: 81 BPM | WEIGHT: 177.6 LBS | TEMPERATURE: 97.9 F | BODY MASS INDEX: 32.68 KG/M2 | SYSTOLIC BLOOD PRESSURE: 122 MMHG

## 2024-05-24 DIAGNOSIS — S39.011D STRAIN OF ABDOMINAL WALL, SUBSEQUENT ENCOUNTER: ICD-10-CM
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
## 2024-05-24 PROCEDURE — 3078F DIAST BP <80 MM HG: CPT

## 2024-05-24 PROCEDURE — 3074F SYST BP LT 130 MM HG: CPT

## 2024-05-24 PROCEDURE — 99213 OFFICE O/P EST LOW 20 MIN: CPT

## 2024-05-24 NOTE — PROGRESS NOTES
"Subjective:   Mirta Hawk is a very pleasant 53 y.o. female who presents for:    Chief Complaint   Patient presents with    Follow-Up     WC follow up - Abdominal pain, pt sts it hasn't changed she still has a hot burning pain in abdomen        HPI:    Copied from previous visit:     DOI: 5/11/24  ANUJA: The patient presents to the urgent care for after sustaining an injury while at work.  She endorses that she was attempting to move a car battery from a shopping cart and put the battery into another cart, when she began to experience pain in her left lower quadrant.  Sensation is described as hot and burning.  Non-radiating. The pain is worse with movement, especially twisting and lifting. The pain is relieved with rest.  She has attempted to wear supportive shape wear to work yesterday, which was mildly helpful at improving her symptoms. She has been taking Tylenol for the pain and inflammation. No spasms, back pain, mass. Denies previous injury.  No second job.    Visit #2: 5/18/24  Date of injury 5/11/2024. Patient was lifting heavy item while at work and shortly thereafter noticed left lateral abdominal pain. Patient has had constant pain since. Denies any fever, changes in stools or urination. She has tried ibuprofen without much improvement. An ultrasound has been ordered but patient has yet to have completed.     Visit #3 5/24/24  US completed on 5/19/24, which was negative for a hernia. She reports that she continues to have a \"hot and burning\" sensation in the LLQ of the abdominal wall. She does not feel that the pain has improved since her DOI. She has been wearing shape wear to help with her symptoms. She has been taking ibuprofen PRN every 6 hours for the pain. No other supportive measures. She reports that she quit her job at [employer] on 5/17/24 as she did \"not want to do what her employer wanted her to do\" which included lifting groceries, twisting, and turning.    ROS:    Review of " Systems   Musculoskeletal:         Abdominal wall pain - LLQ   All other systems reviewed and are negative.      Medications:      Current Outpatient Medications   Medication Sig    Multiple Vitamin (MULTI VITAMIN PO) Take 1 Tab by mouth every day.    Cyanocobalamin (VITAMIN B-12 PO) Take 1 Tab by mouth every day.    Potassium (POTASSIMIN PO) Take 1 Tab by mouth every day.    ferrous sulfate 325 (65 Fe) MG tablet Take 325 mg by mouth every day.    PROAIR  (90 Base) MCG/ACT Aero Soln inhalation aerosol Inhale 2 Puffs by mouth every 6 hours as needed for Shortness of Breath. (Patient not taking: Reported on 5/13/2024)       Allergies:     Allergies   Allergen Reactions    Influenza Virus Vaccine     Lactose Nausea     No milk to drink.      Eggs Itching     Also gets GI distress       Problem List:     Patient Active Problem List   Diagnosis    Asthma, mild intermittent    Autism spectrum disorder    Annular tear of cervical disc    Chronic neck pain       Surgical History:    Past Surgical History:   Procedure Laterality Date    CERVICAL DISK AND FUSION ANTERIOR Right 5/20/2016    Procedure: CERVICAL DISK AND FUSION ANTERIOR C5/6;  Surgeon: Jude Beauchamp III, M.D.;  Location: SURGERY [facility];  Service:     GYN SURGERY      BTL reversal     GYN SURGERY      c sec x 5    TUBAL LIGATION         Past Social Hx:     Social History     Socioeconomic History    Marital status:    Tobacco Use    Smoking status: Never    Smokeless tobacco: Never   Substance and Sexual Activity    Alcohol use: Yes     Comment: Occasionally    Drug use: No        Past Family Hx:      Family History   Problem Relation Age of Onset    Diabetes Mother     Diabetes Father     Heart Disease Maternal Grandmother        Problem list, medications, and allergies reviewed by myself today in Epic.     Objective:     Vitals:    05/24/24 1107   BP: 122/76   Pulse: 81   Resp: 20   Temp: 36.6 °C (97.9 °F)   SpO2: 98%       Physical  Exam  Vitals reviewed.   Constitutional:       Appearance: Normal appearance. She is normal weight.   Musculoskeletal:        Arms:       Comments: Moderate tenderness over the left lower aspect of the anterior abdominal wall with deep palpation.  No rebound or guarding.  No splenomegaly palpated.  No overlying skin changes, palpable masses.   Neurological:      Mental Status: She is alert.     US-HERNIA ABDOMEN     5/19/2024 11:20 AM     HISTORY/REASON FOR EXAM:  Pain; LLQ abdominal pain after lifting heavy object; r/o hernia; work comp        TECHNIQUE/EXAM DESCRIPTION AND NUMBER OF VIEWS:  Ultrasound of the area of concern for hernia at the left mid/lower abdominal wall.     COMPARISON: None.     IMPRESSION/FINDINGS:  No sonographic abnormality visible. No hernia detected in the region of concern.      Assessment/Plan:     Diagnosis and associated orders:     1. Strain of abdominal wall, subsequent encounter  - Referral to Physical Therapy    2. Work related injury  - Referral to Physical Therapy          Comments/MDM:     See scanned D-39  Presentation consistent with abdominal wall strain as patient's ultrasound was negative for hernia  Patient reports that her abdominal pain has not improved   The patient reports that she quit her job 7 days ago, therefore she was encouraged to refrain from twisting, lifting objects greater than 10 pounds, repetitive lifting or physical activity that exacerbates her pain.  No acute peritoneal signs on physical examination  Referral placed to occupational medicine for evaluation and treatment  Continue supportive measures   RTC in 7 days for re-evaluation          All questions answered. Patient verbalized understanding and is in agreement with this plan of care.     If symptoms are worsening or not improving in 3-5 days, follow-up with PCP or return to . Differential diagnosis, natural history, and supportive care discussed. AVS handout given and reviewed with patient.  Patient educated on red flags and when to seek treatment back in ED or UC.     I personally reviewed prior external notes and test results pertinent to today's visit.  I have independently reviewed and interpreted all diagnostics ordered during this urgent care visit.     This dictation has been created using voice recognition software. The accuracy of the dictation is limited by the abilities of the software. I expect there may be some errors of grammar and possibly content. I made every attempt to manually correct the errors within my dictation. However, errors related to voice recognition software may still exist and should be interpreted within the appropriate context.    This note was electronically signed by VIOLETA Presley

## 2024-06-02 ENCOUNTER — OCCUPATIONAL MEDICINE (OUTPATIENT)
Dept: URGENT CARE | Facility: PHYSICIAN GROUP | Age: 54
End: 2024-06-02
Payer: COMMERCIAL

## 2024-06-02 VITALS
TEMPERATURE: 97 F | HEIGHT: 62 IN | DIASTOLIC BLOOD PRESSURE: 84 MMHG | HEART RATE: 77 BPM | SYSTOLIC BLOOD PRESSURE: 128 MMHG | BODY MASS INDEX: 32.68 KG/M2 | WEIGHT: 177.6 LBS | OXYGEN SATURATION: 94 % | RESPIRATION RATE: 18 BRPM

## 2024-06-02 DIAGNOSIS — S39.011D STRAIN OF ABDOMINAL WALL, SUBSEQUENT ENCOUNTER: ICD-10-CM

## 2024-06-02 PROCEDURE — 3074F SYST BP LT 130 MM HG: CPT | Performed by: PHYSICIAN ASSISTANT

## 2024-06-02 PROCEDURE — 3079F DIAST BP 80-89 MM HG: CPT | Performed by: PHYSICIAN ASSISTANT

## 2024-06-02 PROCEDURE — 99213 OFFICE O/P EST LOW 20 MIN: CPT | Performed by: PHYSICIAN ASSISTANT

## 2024-06-02 NOTE — PROGRESS NOTES
"Subjective     Mirta Hawk is a very pleasant 53 y.o. female who presents with Follow-Up (W/C follow up for injury to left lower abdomen.)            HPI    ROS           Objective     /84 (BP Location: Left arm, Patient Position: Sitting, BP Cuff Size: Adult)   Pulse 77   Temp 36.1 °C (97 °F) (Temporal)   Resp 18   Ht 1.575 m (5' 2\")   Wt 80.6 kg (177 lb 9.6 oz)   SpO2 94%   BMI 32.48 kg/m²      Physical Exam                        Assessment & Plan        1. Strain of abdominal wall, subsequent encounter          See scanned to 39 for further information    Finally, we are seeing significant interval improvement.  Continue current treatment plan.  Follow-up in 1 week      Please note that this dictation was created using voice recognition software. I have made every reasonable attempt to correct obvious errors, but I expect that there are errors of grammar and possibly content that I did not discover before finalizing the note.          "

## 2024-07-02 ENCOUNTER — OCCUPATIONAL MEDICINE (OUTPATIENT)
Dept: URGENT CARE | Facility: PHYSICIAN GROUP | Age: 54
End: 2024-07-02
Payer: COMMERCIAL

## 2024-07-02 VITALS
HEART RATE: 77 BPM | DIASTOLIC BLOOD PRESSURE: 80 MMHG | TEMPERATURE: 97.4 F | OXYGEN SATURATION: 98 % | BODY MASS INDEX: 32.57 KG/M2 | RESPIRATION RATE: 18 BRPM | HEIGHT: 62 IN | SYSTOLIC BLOOD PRESSURE: 126 MMHG | WEIGHT: 177 LBS

## 2024-07-02 DIAGNOSIS — S39.011D STRAIN OF ABDOMINAL WALL, SUBSEQUENT ENCOUNTER: ICD-10-CM

## 2024-07-02 PROCEDURE — 3079F DIAST BP 80-89 MM HG: CPT | Performed by: FAMILY MEDICINE

## 2024-07-02 PROCEDURE — 99213 OFFICE O/P EST LOW 20 MIN: CPT | Performed by: FAMILY MEDICINE

## 2024-07-02 PROCEDURE — 3074F SYST BP LT 130 MM HG: CPT | Performed by: FAMILY MEDICINE

## 2024-07-22 ENCOUNTER — OCCUPATIONAL MEDICINE (OUTPATIENT)
Dept: URGENT CARE | Facility: PHYSICIAN GROUP | Age: 54
End: 2024-07-22
Payer: COMMERCIAL

## 2024-07-22 VITALS
OXYGEN SATURATION: 97 % | SYSTOLIC BLOOD PRESSURE: 122 MMHG | TEMPERATURE: 97.7 F | RESPIRATION RATE: 16 BRPM | BODY MASS INDEX: 33.86 KG/M2 | WEIGHT: 184 LBS | HEIGHT: 62 IN | DIASTOLIC BLOOD PRESSURE: 74 MMHG | HEART RATE: 85 BPM

## 2024-07-22 DIAGNOSIS — S39.011D STRAIN OF ABDOMINAL WALL, SUBSEQUENT ENCOUNTER: ICD-10-CM

## 2024-07-22 PROCEDURE — 3074F SYST BP LT 130 MM HG: CPT | Performed by: PHYSICIAN ASSISTANT

## 2024-07-22 PROCEDURE — 99213 OFFICE O/P EST LOW 20 MIN: CPT | Performed by: PHYSICIAN ASSISTANT

## 2024-07-22 PROCEDURE — 3078F DIAST BP <80 MM HG: CPT | Performed by: PHYSICIAN ASSISTANT

## 2024-07-30 ENCOUNTER — OCCUPATIONAL MEDICINE (OUTPATIENT)
Dept: OCCUPATIONAL MEDICINE | Facility: CLINIC | Age: 54
End: 2024-07-30
Payer: COMMERCIAL

## 2024-07-30 VITALS
BODY MASS INDEX: 33.68 KG/M2 | DIASTOLIC BLOOD PRESSURE: 84 MMHG | OXYGEN SATURATION: 98 % | SYSTOLIC BLOOD PRESSURE: 146 MMHG | HEIGHT: 62 IN | HEART RATE: 78 BPM | WEIGHT: 183 LBS | RESPIRATION RATE: 20 BRPM

## 2024-07-30 DIAGNOSIS — S39.011D STRAIN OF ABDOMINAL WALL, SUBSEQUENT ENCOUNTER: ICD-10-CM

## 2024-09-03 ENCOUNTER — OCCUPATIONAL MEDICINE (OUTPATIENT)
Dept: OCCUPATIONAL MEDICINE | Facility: CLINIC | Age: 54
End: 2024-09-03
Payer: COMMERCIAL

## 2024-09-03 VITALS
HEART RATE: 80 BPM | TEMPERATURE: 97 F | RESPIRATION RATE: 16 BRPM | OXYGEN SATURATION: 97 % | WEIGHT: 185 LBS | HEIGHT: 62 IN | SYSTOLIC BLOOD PRESSURE: 152 MMHG | BODY MASS INDEX: 34.04 KG/M2 | DIASTOLIC BLOOD PRESSURE: 98 MMHG

## 2024-09-03 DIAGNOSIS — S39.011D STRAIN OF ABDOMINAL WALL, SUBSEQUENT ENCOUNTER: ICD-10-CM

## 2024-09-03 PROCEDURE — 1125F AMNT PAIN NOTED PAIN PRSNT: CPT | Performed by: PREVENTIVE MEDICINE

## 2024-09-03 PROCEDURE — 3080F DIAST BP >= 90 MM HG: CPT | Performed by: PREVENTIVE MEDICINE

## 2024-09-03 PROCEDURE — 99213 OFFICE O/P EST LOW 20 MIN: CPT | Performed by: PREVENTIVE MEDICINE

## 2024-09-03 PROCEDURE — 3077F SYST BP >= 140 MM HG: CPT | Performed by: PREVENTIVE MEDICINE

## 2024-09-03 ASSESSMENT — PAIN SCALES - GENERAL: PAINLEVEL: 5=MODERATE PAIN

## 2024-09-03 NOTE — PROGRESS NOTES
Subjective:     Mirta Hawk is a 53 y.o. 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  ANUJA: She was lifting up a battery and felt sudden pain in the left upper quadrant of her abdomen.  She was seen in urgent care multiple times.  Ultrasound of the abdomen was negative for any hernia.  She was given referral to physical therapy, occupational health, advised NSAIDs and work restrictions.     7/30/2024: Patient states that overall symptoms have only improved a little bit over the few months.  She states that pain continues to mostly the left upper quadrant.  Pain is worse with coughing, sneezing, laughing or heavy lifting.  She states that she did get a little bit of flareup the last time she went to physical therapy and had some increased pain at the left ribs as well.  She states that she is subsequently quit her job at [employer] and now works at [employer] where she just does  and does not really do any lifting and has been able to do this job without difficulty.  She states she only occasionally takes OTC medications for the pain.  She states she is not really interested in more physical therapy or medications.  She states she is mostly concerned about the duration of symptoms.  Denies any gastrointestinal symptoms associated with the pain.    9/3/2024: Patient states overall symptoms are essentially unchanged, maybe little worse.  She states that she continues to have pain under the left rib/left upper abdomen.  She states that sitting seems to flared up more than standing and walking.  She states that she is able to do her job at [employer] without difficulty.  However still painful with any coughing or sitting for prolonged periods of time.  Using NSAIDs as needed.    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       Objective:     BP (!) 152/98 (BP Location: Right arm, Patient Position: Sitting, BP Cuff Size: Adult)   Pulse 80   Temp 36.1 °C (97 °F)   Resp 16   Ht 1.575 m (5' 2\")   Wt 83.9 kg (185 lb)   SpO2 97%   BMI 33.84 kg/m²     Constitutional: Patient is in no acute distress. Appears well-developed and well-nourished.   Cardiovascular: Normal rate.    Pulmonary/Chest: Effort normal. No respiratory distress.   Neurological: Patient is alert and oriented to person, place, and time.   Skin: Skin is warm and dry.   Psychiatric: Normal mood and affect. Behavior is normal.     Abdomen: Soft, tenderness over the left upper quadrant diffusely.  Some tenderness over the left lower rib as well.  No bulge or protrusion felt with Valsalva.    Assessment/Plan:     1. Strain of abdominal wall, subsequent encounter  - Referral to Pain Clinic      Referral to physiatry  OTC ibuprofen/Tylenol as needed  Gentle stretches stressed exercises      Work Status: Restricted Duty - see D-39 or other state/federal worker's compensation forms for specific restrictions if applicable  Follow up 5 weeks    Differential diagnosis, natural history, supportive care, and indications for immediate follow-up discussed.    Approximately 25 minutes were spent in reviewing notes, preparing for visit, obtaining history, exam and evaluation, patient counseling/education, and post visit documentation/orders. Significant time was spent completing state/federal worker's compensation forms.    "

## 2024-09-03 NOTE — LETTER
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 Yes     Explain:        Objective Medical Findings: Abdomen: Soft, tenderness over the left upper quadrant diffusely.  Some tenderness over the left lower rib as well.  No bulge or protrusion felt with Valsalva.         None - Discharged                         Stable  No                 Ratable  No        Generally Improved                         Condition Worsened               X   Condition Same  May Have Suffered a Permanent Disability No     Treatment Plan:    Referral to physiatry  OTC ibuprofen/Tylenol as needed  Gentle stretches stressed exercises           No Change in Therapy                  PT/OT Prescribed                      Medication May be Used While Working        Case Management                          PT/OT Discontinued    Consultation    Further Diagnostic Studies:    Prescription(s)                 Released to FULL DUTY /No Restrictions on (Date):  From:      Certified TOTALLY TEMPORARILY DISABLED (Indicate Dates) From:   To:    X  Released to RESTRICTED/Modified Duty on (Date): From: 9/3/2024 To: 10/15/2024  Restrictions Are:  Temporary      No Sitting    No Standing    No Pulling Other:         No Bending at Waist     No Stooping     No Lifting        No Carrying     No Walking Lifting Restricted to (lbs.):  < or = to 25 pounds       No Pushing        No Climbing     No Reaching Above Shoulders       Date of Next Visit:  10/15/2024  @9:15 AM  Date of this Exam:  9/3/2024 Physician/Chiropractic Physician Name: Jasbir Allen D.O. Physician/Chiropractic Physician Signature:  Jasbir Allen DO MPH                                                                                                                                                                                                            D-39 (Rev. 2/24)

## 2024-09-03 NOTE — LETTER
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 Yes     Explain:        Objective Medical Findings: Abdomen: Soft, tenderness over the left upper quadrant diffusely.  Some tenderness over the left lower rib as well.  No bulge or protrusion felt with Valsalva.         None - Discharged                         Stable  No                 Ratable  No        Generally Improved                         Condition Worsened               X   Condition Same  May Have Suffered a Permanent Disability No     Treatment Plan:    Finish physical therapy with minimal improvement, we will hold off on any more visits at this point  OTC ibuprofen/Tylenol as needed  Gentle stretches stressed exercises           No Change in Therapy                  PT/OT Prescribed                      Medication May be Used While Working        Case Management                          PT/OT Discontinued    Consultation    Further Diagnostic Studies:    Prescription(s)                 Released to FULL DUTY /No Restrictions on (Date):  From:      Certified TOTALLY TEMPORARILY DISABLED (Indicate Dates) From:   To:    X  Released to RESTRICTED/Modified Duty on (Date): From: 9/3/2024 To: 10/15/2024  Restrictions Are:  Temporary      No Sitting    No Standing    No Pulling Other:         No Bending at Waist     No Stooping     No Lifting        No Carrying     No Walking Lifting Restricted to (lbs.):  < or = to 25 pounds       No Pushing        No Climbing     No Reaching Above  Shoulders       Date of Next Visit:  10/15/2024 Date of this Exam: 9/3/2024 Physician/Chiropractic Physician Name: Jasbir Allen D.O. Physician/Chiropractic Physician Signature:  Jasbir Allen DO MPH                                                                                                                                                                                                            D-39 (Rev. 2/24)

## 2024-09-16 ENCOUNTER — HOSPITAL ENCOUNTER (OUTPATIENT)
Dept: RADIOLOGY | Facility: MEDICAL CENTER | Age: 54
End: 2024-09-16
Attending: FAMILY MEDICINE
Payer: COMMERCIAL

## 2024-09-16 DIAGNOSIS — Z12.31 VISIT FOR SCREENING MAMMOGRAM: ICD-10-CM

## 2024-09-16 PROCEDURE — 77067 SCR MAMMO BI INCL CAD: CPT

## 2024-10-15 ENCOUNTER — OCCUPATIONAL MEDICINE (OUTPATIENT)
Dept: OCCUPATIONAL MEDICINE | Facility: CLINIC | Age: 54
End: 2024-10-15
Payer: COMMERCIAL

## 2024-10-15 VITALS
TEMPERATURE: 97.6 F | DIASTOLIC BLOOD PRESSURE: 84 MMHG | OXYGEN SATURATION: 96 % | HEIGHT: 62 IN | RESPIRATION RATE: 16 BRPM | HEART RATE: 69 BPM | SYSTOLIC BLOOD PRESSURE: 128 MMHG | BODY MASS INDEX: 34.23 KG/M2 | WEIGHT: 186 LBS

## 2024-10-15 DIAGNOSIS — S39.011D STRAIN OF ABDOMINAL WALL, SUBSEQUENT ENCOUNTER: ICD-10-CM

## 2024-10-15 PROCEDURE — 3079F DIAST BP 80-89 MM HG: CPT | Performed by: PREVENTIVE MEDICINE

## 2024-10-15 PROCEDURE — 99213 OFFICE O/P EST LOW 20 MIN: CPT | Performed by: PREVENTIVE MEDICINE

## 2024-10-15 PROCEDURE — 3074F SYST BP LT 130 MM HG: CPT | Performed by: PREVENTIVE MEDICINE

## 2024-10-15 PROCEDURE — 1125F AMNT PAIN NOTED PAIN PRSNT: CPT | Performed by: PREVENTIVE MEDICINE

## 2024-10-15 ASSESSMENT — PAIN SCALES - GENERAL: PAINLEVEL: 4=SLIGHT-MODERATE PAIN

## 2024-11-26 ENCOUNTER — OCCUPATIONAL MEDICINE (OUTPATIENT)
Dept: OCCUPATIONAL MEDICINE | Facility: CLINIC | Age: 54
End: 2024-11-26
Payer: COMMERCIAL

## 2024-11-26 VITALS
DIASTOLIC BLOOD PRESSURE: 90 MMHG | BODY MASS INDEX: 34.04 KG/M2 | RESPIRATION RATE: 16 BRPM | HEIGHT: 62 IN | SYSTOLIC BLOOD PRESSURE: 140 MMHG | WEIGHT: 185 LBS | HEART RATE: 75 BPM | TEMPERATURE: 96.8 F

## 2024-11-26 DIAGNOSIS — S39.011D STRAIN OF ABDOMINAL WALL, SUBSEQUENT ENCOUNTER: ICD-10-CM

## 2024-11-26 ASSESSMENT — PAIN SCALES - GENERAL: PAINLEVEL_OUTOF10: 6=MODERATE PAIN

## 2024-11-26 NOTE — LETTER
PHYSICIAN’S AND CHIROPRACTIC PHYSICIAN'S   PROGRESS REPORT   CERTIFICATION OF DISABILITY Claim Number:     Social Security Number:    Patient’s Name: Mirta Hawk Date of Injury: 5/11/2024   Employer: [employer] Name of MCO (if applicable):      Patient’s Job Description/Occupation:        Previous Injuries/Diseases/Surgeries Contributing to the Condition:         Diagnosis: (S39.011D) Strain of abdominal wall, subsequent encounter      Related to the Industrial Injury? Yes     Explain:        Objective Medical Findings: Abdomen: Soft, tenderness over the left upper quadrant diffusely.  Some tenderness over the left lower ribs as well.  No bulge or protrusion felt with Valsalva.          None - Discharged                         Stable  No                 Ratable  No        Generally Improved                         Condition Worsened               X   Condition Same  May Have Suffered a Permanent Disability No     Treatment Plan:    Referral to physiatry, approved, pending scheduling  OTC ibuprofen/Tylenol as needed  Gentle stretches and exercises as tolerated           No Change in Therapy                  PT/OT Prescribed                      Medication May be Used While Working        Case Management                          PT/OT Discontinued    Consultation    Further Diagnostic Studies:    Prescription(s)                 Released to FULL DUTY /No Restrictions on (Date):       Certified TOTALLY TEMPORARILY DISABLED (Indicate Dates) From:   To:    X  Released to RESTRICTED/Modified Duty on (Date): From: 11/26/2024 To: 12/31/2024  Restrictions Are:  Temporary      No Sitting    No Standing    No Pulling Other:         No Bending at Waist     No Stooping     No Lifting        No Carrying     No Walking Lifting Restricted to (lbs.):  < or = to 25 pounds       No Pushing        No Climbing     No Reaching Above Shoulders       Date of Next Visit:  12/31/2024 @ 1:30 pm Date of  this Exam: 11/26/2024 Physician/Chiropractic Physician Name: Jasbir Allen D.O. Physician/Chiropractic Physician Signature:  Jasbir Allen DO MPH     [place]:  [facility]  [facility], [address] [place], [place] [address] - Telephone [phone] [place]:  [address] [place] [address], [address] [place], [place] [address] - Telephone [phone]    [email or web address]  D-39 (Rev. 10/24)

## 2024-11-26 NOTE — PROGRESS NOTES
Subjective:     Mirta Hawk is a 53 y.o. 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  ANUJA: She was lifting up a battery and felt sudden pain in the left upper quadrant of her abdomen.  She was seen in urgent care multiple times.  Ultrasound of the abdomen was negative for any hernia.  She was given referral to physical therapy, occupational health, advised NSAIDs and work restrictions.     7/30/2024: Patient states that overall symptoms have only improved a little bit over the few months.  She states that pain continues to mostly the left upper quadrant.  Pain is worse with coughing, sneezing, laughing or heavy lifting.  She states that she did get a little bit of flareup the last time she went to physical therapy and had some increased pain at the left ribs as well.  She states that she is subsequently quit her job at [employer] and now works at [employer] where she just does  and does not really do any lifting and has been able to do this job without difficulty.  She states she only occasionally takes OTC medications for the pain.  She states she is not really interested in more physical therapy or medications.  She states she is mostly concerned about the duration of symptoms.  Denies any gastrointestinal symptoms associated with the pain.     9/3/2024: Patient states overall symptoms are essentially unchanged, maybe little worse.  She states that she continues to have pain under the left rib/left upper abdomen.  She states that sitting seems to flared up more than standing and walking.  She states that she is able to do her job at [email or web address] without difficulty.  However still painful with any coughing or sitting for prolonged periods of time.  Using NSAIDs as needed.     10/15/2024: Patient states overall she has had a little bit of improvement in symptoms.   She states not quite as painful as before.  However if she does do repetitive or heavy lifting she does get increased pain.  She has not heard anything from her work comp insurance in regards to the referral to physiatry:    11/26/2024: Patient states overall symptoms are pretty much the same.  She states she did have a week or 2 her symptoms were worse when she had a cold and was coughing a lot.  She noted increased pain in the left lower ribs and upper abdomen.  She states that symptoms have improved a little bit since then.  She states she was just notified on Monday that the physiatry referral has been approved.  She tried calling but was unable to get through to anybody with whom she gets scheduled.  Plans on attempting again today.    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       Objective:     There were no vitals taken for this visit.    Constitutional: Patient is in no acute distress. Appears well-developed and well-nourished.   Cardiovascular: Normal rate.    Pulmonary/Chest: Effort normal. No respiratory distress.   Neurological: Patient is alert and oriented to person, place, and time.   Skin: Skin is warm and dry.   Psychiatric: Normal mood and affect. Behavior is normal.     Abdomen: Soft, tenderness over the left upper quadrant diffusely.  Some tenderness over the left lower ribs as well.  No bulge or protrusion felt with Valsalva.     Assessment/Plan:     1. Strain of abdominal wall, subsequent encounter      Referral to physiatry, approved, pending scheduling  OTC ibuprofen/Tylenol as needed  Gentle stretches and exercises as tolerated      Work Status: Restricted Duty - see D-39 or other state/federal worker's compensation forms for specific restrictions if applicable  Follow up 6 weeks, if not seen by Physiatry    Differential diagnosis, natural history, supportive care, and indications for immediate follow-up  discussed.    Approximately 21 minutes were spent in reviewing notes, preparing for visit, obtaining history, exam and evaluation, patient counseling/education, and post visit documentation/orders. Significant time was spent completing state/federal worker's compensation forms.

## 2024-12-31 ENCOUNTER — OCCUPATIONAL MEDICINE (OUTPATIENT)
Dept: OCCUPATIONAL MEDICINE | Facility: CLINIC | Age: 54
End: 2024-12-31
Payer: COMMERCIAL

## 2024-12-31 VITALS
DIASTOLIC BLOOD PRESSURE: 96 MMHG | WEIGHT: 185 LBS | TEMPERATURE: 97 F | HEART RATE: 85 BPM | HEIGHT: 62 IN | BODY MASS INDEX: 34.04 KG/M2 | SYSTOLIC BLOOD PRESSURE: 138 MMHG | OXYGEN SATURATION: 97 %

## 2024-12-31 DIAGNOSIS — S39.011D STRAIN OF ABDOMINAL WALL, SUBSEQUENT ENCOUNTER: ICD-10-CM

## 2024-12-31 NOTE — LETTER
PHYSICIAN’S AND CHIROPRACTIC PHYSICIAN'S   PROGRESS REPORT   CERTIFICATION OF DISABILITY Claim Number:     Social Security Number:    Patient’s Name: Mirta Hawk Date of Injury: 5/11/2024   Employer: [employer] Name of MCO (if applicable):      Patient’s Job Description/Occupation:        Previous Injuries/Diseases/Surgeries Contributing to the Condition:         Diagnosis: (S39.011D) Strain of abdominal wall, subsequent encounter      Related to the Industrial Injury? Yes     Explain:        Objective Medical Findings: Abdomen: Soft, tenderness over the left upper quadrant diffusely.  Some tenderness over the left lower ribs as well.  No bulge or protrusion felt with Valsalva.          None - Discharged                         Stable  No                 Ratable  No        Generally Improved                         Condition Worsened               X   Condition Same  May Have Suffered a Permanent Disability No     Treatment Plan:    Referral to physiatry, approved, pending scheduling  OTC ibuprofen/Tylenol as needed  Gentle stretches and exercises as tolerated           No Change in Therapy                  PT/OT Prescribed                      Medication May be Used While Working        Case Management                          PT/OT Discontinued    Consultation    Further Diagnostic Studies:    Prescription(s)                 Released to FULL DUTY /No Restrictions on (Date):       Certified TOTALLY TEMPORARILY DISABLED (Indicate Dates) From:   To:    X  Released to RESTRICTED/Modified Duty on (Date): From: 12/31/2024 To: 2/4/2025  Restrictions Are:  Temporary      No Sitting    No Standing    No Pulling Other:         No Bending at Waist     No Stooping     No Lifting        No Carrying     No Walking Lifting Restricted to (lbs.):  < or = to 25 pounds       No Pushing        No Climbing     No Reaching Above Shoulders       Date of Next Visit:  2/04/2025 AT 11:00 AM Date of this  Exam: 12/31/2024 Physician/Chiropractic Physician Name: Jasbir Allen D.O. Physician/Chiropractic Physician Signature:  Jasbir Allen DO MPH     [place]:  [facility]  [facility], [address] [place], [place] [address] - Telephone [phone] [place]:  [address] [place] [address], [address] [place], [place] [address] - Telephone [phone]    [email or web address]  D-39 (Rev. 10/24)

## 2024-12-31 NOTE — PROGRESS NOTES
Subjective:     Mirta Hawk is a 54 y.o. 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  ANUJA: She was lifting up a battery and felt sudden pain in the left upper quadrant of her abdomen.  She was seen in urgent care multiple times.  Ultrasound of the abdomen was negative for any hernia.  She was given referral to physical therapy, occupational health, advised NSAIDs and work restrictions.     7/30/2024: Patient states that overall symptoms have only improved a little bit over the few months.  She states that pain continues to mostly the left upper quadrant.  Pain is worse with coughing, sneezing, laughing or heavy lifting.  She states that she did get a little bit of flareup the last time she went to physical therapy and had some increased pain at the left ribs as well.  She states that she is subsequently quit her job at [employer] and now works at [employer] where she just does  and does not really do any lifting and has been able to do this job without difficulty.  She states she only occasionally takes OTC medications for the pain.  She states she is not really interested in more physical therapy or medications.  She states she is mostly concerned about the duration of symptoms.  Denies any gastrointestinal symptoms associated with the pain.     9/3/2024: Patient states overall symptoms are essentially unchanged, maybe little worse.  She states that she continues to have pain under the left rib/left upper abdomen.  She states that sitting seems to flared up more than standing and walking.  She states that she is able to do her job at [employer] without difficulty.  However still painful with any coughing or sitting for prolonged periods of time.  Using NSAIDs as needed.     10/15/2024: Patient states overall she has had a little bit of improvement in symptoms.  She states not quite as painful  "as before.  However if she does do repetitive or heavy lifting she does get increased pain.  She has not heard anything from her work comp insurance in regards to the referral to physiatry:     11/26/2024: Patient states overall symptoms are pretty much the same.  She states she did have a week or 2 her symptoms were worse when she had a cold and was coughing a lot.  She noted increased pain in the left lower ribs and upper abdomen.  She states that symptoms have improved a little bit since then.  She states she was just notified on Monday that the physiatry referral has been approved.  She tried calling but was unable to get through to anybody with whom she gets scheduled.  Plans on attempting again today.    12/31/2024: Patient states that overall symptoms are about the same.  Continues to have left lower rib/left upper quadrant pain.  This is worse with coughing or heavy lifting.  She states at work she is able to stay within her work restrictions pretty well.  She states she tried to schedule her physiatry referral at the [facility] but was told by the  staff that doctors there \"do not deal with her condition.\"  She states she spoke to her case  and they are going to try to arrange for her to be seen in different clinic.    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       Objective:     BP (!) 138/96 (BP Location: Right arm, Patient Position: Sitting, BP Cuff Size: Adult)   Pulse 85   Temp 36.1 °C (97 °F) (Temporal)   Ht 1.575 m (5' 2\")   Wt 83.9 kg (185 lb)   SpO2 97%   BMI 33.84 kg/m²     Constitutional: Patient is in no acute distress. Appears well-developed and well-nourished.   Cardiovascular: Normal rate.    Pulmonary/Chest: Effort normal. No respiratory distress.   Neurological: Patient is alert and oriented to person, place, and time.   Skin: Skin is warm and dry.   Psychiatric: Normal mood and " affect. Behavior is normal.     Abdomen: Soft, tenderness over the left upper quadrant diffusely.  Some tenderness over the left lower ribs as well.  No bulge or protrusion felt with Valsalva.     Assessment/Plan:     1. Strain of abdominal wall, subsequent encounter      Referral to physiatry, approved, pending scheduling  OTC ibuprofen/Tylenol as needed  Gentle stretches and exercises as tolerated      Work Status: Restricted Duty - see D-39 or other state/federal worker's compensation forms for specific restrictions if applicable  Follow up 5 weeks    Differential diagnosis, natural history, supportive care, and indications for immediate follow-up discussed.

## 2025-02-11 ENCOUNTER — APPOINTMENT (OUTPATIENT)
Dept: OCCUPATIONAL MEDICINE | Facility: CLINIC | Age: 55
End: 2025-02-11
Payer: COMMERCIAL

## 2025-02-11 VITALS
OXYGEN SATURATION: 96 % | WEIGHT: 185 LBS | TEMPERATURE: 96.8 F | HEIGHT: 62 IN | HEART RATE: 98 BPM | SYSTOLIC BLOOD PRESSURE: 140 MMHG | DIASTOLIC BLOOD PRESSURE: 90 MMHG | BODY MASS INDEX: 34.04 KG/M2

## 2025-02-11 DIAGNOSIS — S39.011D STRAIN OF ABDOMINAL WALL, SUBSEQUENT ENCOUNTER: ICD-10-CM

## 2025-02-11 PROCEDURE — 99213 OFFICE O/P EST LOW 20 MIN: CPT | Performed by: PREVENTIVE MEDICINE

## 2025-02-11 NOTE — LETTER
PHYSICIAN’S AND CHIROPRACTIC PHYSICIAN'S   PROGRESS REPORT   CERTIFICATION OF DISABILITY Claim Number:     Social Security Number:    Patient’s Name: Mirta Hawk Date of Injury: 5/11/2024   Employer: [employer] Name of MCO (if applicable):      Patient’s Job Description/Occupation:        Previous Injuries/Diseases/Surgeries Contributing to the Condition:         Diagnosis: (S39.011D) Strain of abdominal wall, subsequent encounter      Related to the Industrial Injury? Yes     Explain:        Objective Medical Findings: Abdomen: Soft, tenderness over the left upper quadrant diffusely.  Some tenderness over the left lower ribs as well.  No bulge or protrusion felt with Valsalva.          None - Discharged                         Stable  No                 Ratable  No        Generally Improved                         Condition Worsened               X   Condition Same  May Have Suffered a Permanent Disability No     Treatment Plan:    Has an appointment with [facility] in March  OTC ibuprofen/Tylenol as needed  Gentle stretches and exercises as tolerated           No Change in Therapy                  PT/OT Prescribed                      Medication May be Used While Working        Case Management                          PT/OT Discontinued    Consultation    Further Diagnostic Studies:    Prescription(s)                 Released to FULL DUTY /No Restrictions on (Date):       Certified TOTALLY TEMPORARILY DISABLED (Indicate Dates) From:   To:    X  Released to RESTRICTED/Modified Duty on (Date): From: 2/11/2025 To: 3/18/2025  Restrictions Are:  Temporary      No Sitting    No Standing    No Pulling Other:         No Bending at Waist     No Stooping     No Lifting        No Carrying     No Walking Lifting Restricted to (lbs.):  < or = to 25 pounds       No Pushing        No Climbing     No Reaching Above Shoulders       Date of Next Visit:  3/18/2025 @ 10:00 am Date of this  Exam: 2/11/2025 Physician/Chiropractic Physician Name: Jasbir Allen D.O. Physician/Chiropractic Physician Signature:  Jasbir Allen DO MPH     [place]:  [facility]  [facility] [place] [facility], [address] [place], [place] [address] - Telephone [phone] [place]:  [address] [place] [address], [address] [place], [place] [address] - Telephone [phone]    [email or web address]  D-39 (Rev. 10/24)

## 2025-02-11 NOTE — PROGRESS NOTES
Subjective:     Mirta Hawk is a 54 y.o. 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  ANUJA: She was lifting up a battery and felt sudden pain in the left upper quadrant of her abdomen.  She was seen in urgent care multiple times.  Ultrasound of the abdomen was negative for any hernia.  She was given referral to physical therapy, occupational health, advised NSAIDs and work restrictions.     7/30/2024: Patient states that overall symptoms have only improved a little bit over the few months.  She states that pain continues to mostly the left upper quadrant.  Pain is worse with coughing, sneezing, laughing or heavy lifting.  She states that she did get a little bit of flareup the last time she went to physical therapy and had some increased pain at the left ribs as well.  She states that she is subsequently quit her job at [employer] and now works at [employer] where she just does  and does not really do any lifting and has been able to do this job without difficulty.  She states she only occasionally takes OTC medications for the pain.  She states she is not really interested in more physical therapy or medications.  She states she is mostly concerned about the duration of symptoms.  Denies any gastrointestinal symptoms associated with the pain.     9/3/2024: Patient states overall symptoms are essentially unchanged, maybe little worse.  She states that she continues to have pain under the left rib/left upper abdomen.  She states that sitting seems to flared up more than standing and walking.  She states that she is able to do her job at [employer] without difficulty.  However still painful with any coughing or sitting for prolonged periods of time.  Using NSAIDs as needed.     10/15/2024: Patient states overall she has had a little bit of improvement in symptoms.  She states not quite as painful as  "before.  However if she does do repetitive or heavy lifting she does get increased pain.  She has not heard anything from her work comp insurance in regards to the referral to physiatry:     11/26/2024: Patient states overall symptoms are pretty much the same.  She states she did have a week or 2 her symptoms were worse when she had a cold and was coughing a lot.  She noted increased pain in the left lower ribs and upper abdomen.  She states that symptoms have improved a little bit since then.  She states she was just notified on Monday that the physiatry referral has been approved.  She tried calling but was unable to get through to anybody with whom she gets scheduled.  Plans on attempting again today.     12/31/2024: Patient states that overall symptoms are about the same.  Continues to have left lower rib/left upper quadrant pain.  This is worse with coughing or heavy lifting.  She states at work she is able to stay within her work restrictions pretty well.  She states she tried to schedule her physiatry referral at the [facility] but was told by the  staff that doctors there \"do not deal with her condition.\"  She states she spoke to her case  and they are going to try to arrange for her to be seen in different clinic.    2/11/2025: Patient states overall symptoms are pretty much unchanged.  She states that she was able to get an appointment with [facility].  She states that [facility] would not see her.    DAVID    SOCHX: Worked as a  at [employer] now works at [employer]  FH: No pertinent family history to this problem.       Objective:     BP (!) 140/90 (BP Location: Right arm, Patient Position: Sitting, BP Cuff Size: Adult)   Pulse 98   Temp 36 °C (96.8 °F) (Temporal)   Ht 1.575 m (5' 2\")   Wt 83.9 kg (185 lb)   SpO2 96%   BMI 33.84 kg/m²     Constitutional: Patient is in no acute distress. Appears well-developed and well-nourished.   Cardiovascular: Normal rate.  "   Pulmonary/Chest: Effort normal. No respiratory distress.   Neurological: Patient is alert and oriented to person, place, and time.   Skin: Skin is warm and dry.   Psychiatric: Normal mood and affect. Behavior is normal.     Abdomen: Soft, tenderness over the left upper quadrant diffusely.  Some tenderness over the left lower ribs as well.  No bulge or protrusion felt with Valsalva.     Assessment/Plan:     1. Strain of abdominal wall, subsequent encounter      Has an appointment with [facility] in March  OTC ibuprofen/Tylenol as needed  Gentle stretches and exercises as tolerated      Work Status: Restricted Duty - see D-39 or other state/federal worker's compensation forms for specific restrictions if applicable  Follow up 4 weeks    Differential diagnosis, natural history, supportive care, and indications for immediate follow-up discussed.

## 2025-03-18 ENCOUNTER — OCCUPATIONAL MEDICINE (OUTPATIENT)
Dept: OCCUPATIONAL MEDICINE | Facility: CLINIC | Age: 55
End: 2025-03-18
Payer: COMMERCIAL

## 2025-03-18 VITALS
OXYGEN SATURATION: 96 % | BODY MASS INDEX: 34.04 KG/M2 | HEART RATE: 92 BPM | DIASTOLIC BLOOD PRESSURE: 84 MMHG | RESPIRATION RATE: 18 BRPM | WEIGHT: 185 LBS | TEMPERATURE: 97.4 F | HEIGHT: 62 IN | SYSTOLIC BLOOD PRESSURE: 152 MMHG

## 2025-03-18 DIAGNOSIS — S39.011D STRAIN OF ABDOMINAL WALL, SUBSEQUENT ENCOUNTER: ICD-10-CM

## 2025-03-18 PROCEDURE — 99213 OFFICE O/P EST LOW 20 MIN: CPT | Performed by: PREVENTIVE MEDICINE

## 2025-03-18 ASSESSMENT — PAIN SCALES - GENERAL: PAINLEVEL_OUTOF10: 6=MODERATE PAIN

## 2025-03-18 NOTE — PROGRESS NOTES
Subjective:     Mirta Hawk is a 54 y.o. female who presents for No chief complaint on file.    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  ANUJA: She was lifting up a battery and felt sudden pain in the left upper quadrant of her abdomen.  She was seen in urgent care multiple times.  Ultrasound of the abdomen was negative for any hernia.  She was given referral to physical therapy, occupational health, advised NSAIDs and work restrictions.     7/30/2024: Patient states that overall symptoms have only improved a little bit over the few months.  She states that pain continues to mostly the left upper quadrant.  Pain is worse with coughing, sneezing, laughing or heavy lifting.  She states that she did get a little bit of flareup the last time she went to physical therapy and had some increased pain at the left ribs as well.  She states that she is subsequently quit her job at [employer] and now works at [employer] where she just does  and does not really do any lifting and has been able to do this job without difficulty.  She states she only occasionally takes OTC medications for the pain.  She states she is not really interested in more physical therapy or medications.  She states she is mostly concerned about the duration of symptoms.  Denies any gastrointestinal symptoms associated with the pain.     9/3/2024: Patient states overall symptoms are essentially unchanged, maybe little worse.  She states that she continues to have pain under the left rib/left upper abdomen.  She states that sitting seems to flared up more than standing and walking.  She states that she is able to do her job at [employer] without difficulty.  However still painful with any coughing or sitting for prolonged periods of time.  Using NSAIDs as needed.     10/15/2024: Patient states overall she has had a little bit of improvement in symptoms.  She states not quite as painful as before.  " However if she does do repetitive or heavy lifting she does get increased pain.  She has not heard anything from her work comp insurance in regards to the referral to physiatry:     11/26/2024: Patient states overall symptoms are pretty much the same.  She states she did have a week or 2 her symptoms were worse when she had a cold and was coughing a lot.  She noted increased pain in the left lower ribs and upper abdomen.  She states that symptoms have improved a little bit since then.  She states she was just notified on Monday that the physiatry referral has been approved.  She tried calling but was unable to get through to anybody with whom she gets scheduled.  Plans on attempting again today.     12/31/2024: Patient states that overall symptoms are about the same.  Continues to have left lower rib/left upper quadrant pain.  This is worse with coughing or heavy lifting.  She states at work she is able to stay within her work restrictions pretty well.  She states she tried to schedule her physiatry referral at the [facility] but was told by the  staff that doctors there \"do not deal with her condition.\"  She states she spoke to her case  and they are going to try to arrange for her to be seen in different clinic.     2/11/2025: Patient states overall symptoms are pretty much unchanged.  She states that she was able to get an appointment with [facility].  She states that [facility] would not see her.    3/18/2025: Symptoms unchanged.  Saw [facility] who recommended MRI abdomen without.  She performed this just prior to coming to this appointment.  Has a follow-up with [facility].    DAVID    SOCHX: Works as a  at [employer]  FH: No pertinent family history to this problem.       Objective:     There were no vitals taken for this visit.    Constitutional: Patient is in no acute distress. Appears well-developed and well-nourished.   Cardiovascular: Normal rate.  "   Pulmonary/Chest: Effort normal. No respiratory distress.   Neurological: Patient is alert and oriented to person, place, and time.   Skin: Skin is warm and dry.   Psychiatric: Normal mood and affect. Behavior is normal.     Abdomen: Soft, tenderness over the left upper quadrant diffusely.  Some tenderness over the left lower ribs as well.      Assessment/Plan:     1. Strain of abdominal wall, subsequent encounter      MRI Abdomen ordered at [facility] ibuprofen/Tylenol as needed  Gentle stretches and exercises as tolerated      Work Status: Restricted Duty - see D-39 or other state/federal worker's compensation forms for specific restrictions if applicable  Follow up 4 weeks    Differential diagnosis, natural history, supportive care, and indications for immediate follow-up discussed.

## 2025-03-18 NOTE — LETTER
PHYSICIAN’S AND CHIROPRACTIC PHYSICIAN'S   PROGRESS REPORT   CERTIFICATION OF DISABILITY Claim Number:     Social Security Number:    Patient’s Name: Mirta Hawk Date of Injury: 5/11/2024   Employer: [employer] Name of [employer] (if applicable):      Patient’s Job Description/Occupation:        Previous Injuries/Diseases/Surgeries Contributing to the Condition:         Diagnosis: (S39.011D) Strain of abdominal wall, subsequent encounter      Related to the Industrial Injury? Yes     Explain:        Objective Medical Findings: Abdomen: Soft, tenderness over the left upper quadrant diffusely.  Some tenderness over the left lower ribs as well.           None - Discharged                         Stable  No                 Ratable  No        Generally Improved                         Condition Worsened               X   Condition Same  May Have Suffered a Permanent Disability No     Treatment Plan:    MRI Abdomen ordered at [facility] ibuprofen/Tylenol as needed  Gentle stretches and exercises as tolerated           No Change in Therapy                  PT/OT Prescribed                      Medication May be Used While Working        Case Management                          PT/OT Discontinued    Consultation    Further Diagnostic Studies:    Prescription(s)                 Released to FULL DUTY /No Restrictions on (Date):       Certified TOTALLY TEMPORARILY DISABLED (Indicate Dates) From:   To:    X  Released to RESTRICTED/Modified Duty on (Date): From: 3/18/2025 To: 4/22/2025  Restrictions Are:  Temporary      No Sitting    No Standing    No Pulling Other:         No Bending at Waist     No Stooping     No Lifting        No Carrying     No Walking Lifting Restricted to (lbs.):  < or = to 25 pounds       No Pushing        No Climbing     No Reaching Above Shoulders       Date of Next Visit:  4/22/2025 at 8:00 AM Date of this Exam: 3/18/2025 Physician/Chiropractic Physician Name:  Jasbir Allen D.O. Physician/Chiropractic Physician Signature:  aJsbir Allen DO MPH     [place]:  [facility]  [facility], [address] [place], [place] [address] - Telephone [phone] [place]:  [address] [place] [address], [address] [place], [place] [address] - Telephone [phone]    [email or web address]  D-39 (Rev. 10/24)

## 2025-04-15 ENCOUNTER — OFFICE VISIT (OUTPATIENT)
Dept: URGENT CARE | Facility: PHYSICIAN GROUP | Age: 55
End: 2025-04-15
Payer: COMMERCIAL

## 2025-04-15 VITALS
SYSTOLIC BLOOD PRESSURE: 132 MMHG | TEMPERATURE: 97.5 F | BODY MASS INDEX: 32.04 KG/M2 | DIASTOLIC BLOOD PRESSURE: 80 MMHG | WEIGHT: 180.8 LBS | RESPIRATION RATE: 16 BRPM | HEART RATE: 87 BPM | HEIGHT: 63 IN | OXYGEN SATURATION: 97 %

## 2025-04-15 DIAGNOSIS — H66.002 ACUTE SUPPURATIVE OTITIS MEDIA OF LEFT EAR WITHOUT SPONTANEOUS RUPTURE OF TYMPANIC MEMBRANE, RECURRENCE NOT SPECIFIED: ICD-10-CM

## 2025-04-15 PROCEDURE — 3075F SYST BP GE 130 - 139MM HG: CPT | Performed by: FAMILY MEDICINE

## 2025-04-15 PROCEDURE — 99214 OFFICE O/P EST MOD 30 MIN: CPT | Performed by: FAMILY MEDICINE

## 2025-04-15 PROCEDURE — 3079F DIAST BP 80-89 MM HG: CPT | Performed by: FAMILY MEDICINE

## 2025-04-15 NOTE — PROGRESS NOTES
Subjective:      Chief Complaint   Patient presents with    Otalgia     Pt states left ear pain that started last night               Otalgia- left  This is a new problem. The current episode started in the past 2  days. The problem occurs constantly. The problem has been unchanged. Associated symptoms include fever and coughing. Pertinent negatives include no abdominal pain, chest pain, chills,  , headaches, joint swelling, myalgias, nausea, neck pain, rash or visual change. Nothing aggravates the symptoms. She has tried nothing for the symptoms.     Social History     Tobacco Use    Smoking status: Never    Smokeless tobacco: Never   Substance Use Topics    Alcohol use: Yes     Comment: Occasionally    Drug use: No         Current Outpatient Medications on File Prior to Visit   Medication Sig Dispense Refill    Multiple Vitamin (MULTI VITAMIN PO) Take 1 Tab by mouth every day.      Cyanocobalamin (VITAMIN B-12 PO) Take 1 Tab by mouth every day.      Potassium (POTASSIMIN PO) Take 1 Tab by mouth every day.      ferrous sulfate 325 (65 Fe) MG tablet Take 325 mg by mouth every day.       No current facility-administered medications on file prior to visit.         Past Medical History:   Diagnosis Date    Autism spectrum disorder 3/4/2015    Hochberger's disease  anxiety and depression    Arthritis     osteo back feet legs arms hands    Asthma     daily inhaler    Breath shortness     Cold     may 1, 16    Pain     Snoring          Family History   Problem Relation Age of Onset    Diabetes Mother     Diabetes Father     Heart Disease Maternal Grandmother           Review of Systems   Constitutional: +fatigue  HENT: Positive for congestion and ear pain. Negative for hearing loss and tinnitus.    Respiratory:   Negative for hemoptysis, shortness of breath and wheezing.    Cardiovascular: Negative for chest pain, palpitations and leg swelling.   Gastrointestinal: Negative for nausea and abdominal pain.  "  Musculoskeletal: Negative for myalgias, joint swelling and neck pain.   Skin: Negative for rash.   Neurological: Negative for headaches.   All other systems reviewed and are negative.         Objective:     /80   Pulse 87   Temp 36.4 °C (97.5 °F) (Temporal)   Resp 16   Ht 1.588 m (5' 2.5\")   Wt 82 kg (180 lb 12.8 oz)   SpO2 97%     Physical Exam   Constitutional: Vital signs are normal.  No distress.   HENT:   Head: There is normal jaw occlusion.   Right Ear: External ear normal. Tympanic membrane is normal. No middle ear effusion.   Left Ear: External ear normal. Tympanic membrane is abnormal - erythematous and bulging. A middle ear effusion is present.   Nose:  No nasal discharge.   Mouth/Throat: Mucous membranes are moist. No oral lesions.    Eyes: Conjunctivae and EOM are normal. Pupils are equal, round, and reactive to light. Right eye exhibits no discharge. Left eye exhibits no discharge.   Neck: Normal range of motion. Neck supple.    Cardiovascular: Normal rate and regular rhythm.  Pulses are palpable.    No murmur heard.  Pulmonary/Chest: Effort normal and breath sounds normal. There is normal air entry. No respiratory distress. no wheezes, rhonchi,  retraction.   Musculoskeletal:   no edema.   Neurological: A/O x 3.   CN 2-12 intact   Skin: Skin is warm. Capillary refill takes less than 3 seconds. No purpura and no rash noted. Patient is not diaphoretic. No jaundice or pallor.   Nursing note and vitals reviewed.              Assessment/Plan:     1. Acute suppurative otitis media of left ear without spontaneous rupture of tympanic membrane, recurrence not specified     - amoxicillin-clavulanate (AUGMENTIN) 875-125 MG Tab; Take 1 Tablet by mouth 2 times a day for 7 days.  Dispense: 14 Tablet; Refill: 0      Differential diagnosis, natural history, supportive care, and indications for immediate follow-up discussed. All questions answered. Patient agrees with the plan of care.     Follow-up as " needed if symptoms worsen or fail to improve to PCP, Urgent care or Emergency Room.     I have personally reviewed prior external notes and test results pertinent to today's visit.  I have independently reviewed and interpreted all diagnostics ordered during this urgent care acute visit.

## 2025-04-28 ENCOUNTER — TELEPHONE (OUTPATIENT)
Dept: OCCUPATIONAL MEDICINE | Facility: CLINIC | Age: 55
End: 2025-04-28
Payer: COMMERCIAL

## 2025-04-29 ENCOUNTER — TELEPHONE (OUTPATIENT)
Dept: OCCUPATIONAL MEDICINE | Facility: CLINIC | Age: 55
End: 2025-04-29
Payer: COMMERCIAL

## 2025-04-29 NOTE — TELEPHONE ENCOUNTER
Patient no-showed to  appt. LVM asking if patient needed to be put back on [facility] schedule, or if she's been transferred over to physiatry specialist. [phone].